# Patient Record
Sex: MALE | Race: ASIAN | NOT HISPANIC OR LATINO | Employment: FULL TIME | URBAN - METROPOLITAN AREA
[De-identification: names, ages, dates, MRNs, and addresses within clinical notes are randomized per-mention and may not be internally consistent; named-entity substitution may affect disease eponyms.]

---

## 2019-01-14 DIAGNOSIS — F31.9 BIPOLAR DISORDER, UNSPECIFIED (H): Primary | ICD-10-CM

## 2019-01-14 LAB
ALBUMIN SERPL-MCNC: 3.8 G/DL (ref 3.4–5)
ALP SERPL-CCNC: 39 U/L (ref 40–150)
ALT SERPL W P-5'-P-CCNC: 25 U/L (ref 0–70)
ANION GAP SERPL CALCULATED.3IONS-SCNC: 9 MMOL/L (ref 3–14)
AST SERPL W P-5'-P-CCNC: 17 U/L (ref 0–45)
BASOPHILS # BLD AUTO: 0 10E9/L (ref 0–0.2)
BASOPHILS NFR BLD AUTO: 0.5 %
BILIRUB SERPL-MCNC: 0.5 MG/DL (ref 0.2–1.3)
BUN SERPL-MCNC: 19 MG/DL (ref 7–30)
CALCIUM SERPL-MCNC: 9.1 MG/DL (ref 8.5–10.1)
CHLORIDE SERPL-SCNC: 105 MMOL/L (ref 94–109)
CHOLEST SERPL-MCNC: 176 MG/DL
CO2 SERPL-SCNC: 27 MMOL/L (ref 20–32)
CREAT SERPL-MCNC: 0.8 MG/DL (ref 0.66–1.25)
DIFFERENTIAL METHOD BLD: NORMAL
EOSINOPHIL # BLD AUTO: 0.2 10E9/L (ref 0–0.7)
EOSINOPHIL NFR BLD AUTO: 4.7 %
ERYTHROCYTE [DISTWIDTH] IN BLOOD BY AUTOMATED COUNT: 11.7 % (ref 10–15)
GFR SERPL CREATININE-BSD FRML MDRD: >90 ML/MIN/{1.73_M2}
GGT SERPL-CCNC: 17 U/L (ref 0–75)
GLUCOSE SERPL-MCNC: 96 MG/DL (ref 70–99)
HCT VFR BLD AUTO: 44.2 % (ref 40–53)
HDLC SERPL-MCNC: 65 MG/DL
HGB BLD-MCNC: 14.9 G/DL (ref 13.3–17.7)
LDLC SERPL CALC-MCNC: 95 MG/DL
LYMPHOCYTES # BLD AUTO: 2 10E9/L (ref 0.8–5.3)
LYMPHOCYTES NFR BLD AUTO: 44.2 %
MCH RBC QN AUTO: 33 PG (ref 26.5–33)
MCHC RBC AUTO-ENTMCNC: 33.7 G/DL (ref 31.5–36.5)
MCV RBC AUTO: 98 FL (ref 78–100)
MONOCYTES # BLD AUTO: 0.4 10E9/L (ref 0–1.3)
MONOCYTES NFR BLD AUTO: 9 %
NEUTROPHILS # BLD AUTO: 1.8 10E9/L (ref 1.6–8.3)
NEUTROPHILS NFR BLD AUTO: 41.6 %
NONHDLC SERPL-MCNC: 111 MG/DL
PLATELET # BLD AUTO: 156 10E9/L (ref 150–450)
POTASSIUM SERPL-SCNC: 4.1 MMOL/L (ref 3.4–5.3)
PROT SERPL-MCNC: 7.1 G/DL (ref 6.8–8.8)
RBC # BLD AUTO: 4.51 10E12/L (ref 4.4–5.9)
SODIUM SERPL-SCNC: 141 MMOL/L (ref 133–144)
TRIGL SERPL-MCNC: 82 MG/DL
TSH SERPL DL<=0.005 MIU/L-ACNC: 0.91 MU/L (ref 0.4–4)
VALPROATE SERPL-MCNC: 118 MG/L (ref 50–100)
WBC # BLD AUTO: 4.4 10E9/L (ref 4–11)

## 2019-01-14 PROCEDURE — 80061 LIPID PANEL: CPT

## 2019-01-14 PROCEDURE — 36415 COLL VENOUS BLD VENIPUNCTURE: CPT

## 2019-01-14 PROCEDURE — 82977 ASSAY OF GGT: CPT

## 2019-01-14 PROCEDURE — 85025 COMPLETE CBC W/AUTO DIFF WBC: CPT

## 2019-01-14 PROCEDURE — 80164 ASSAY DIPROPYLACETIC ACD TOT: CPT

## 2019-01-14 PROCEDURE — 80053 COMPREHEN METABOLIC PANEL: CPT

## 2019-01-14 PROCEDURE — 84443 ASSAY THYROID STIM HORMONE: CPT

## 2019-08-05 ENCOUNTER — COMMUNICATION - HEALTHEAST (OUTPATIENT)
Dept: TELEHEALTH | Facility: CLINIC | Age: 44
End: 2019-08-05

## 2019-08-05 ENCOUNTER — OFFICE VISIT - HEALTHEAST (OUTPATIENT)
Dept: INTERNAL MEDICINE | Facility: CLINIC | Age: 44
End: 2019-08-05

## 2019-08-05 DIAGNOSIS — R68.89 FLU-LIKE SYMPTOMS: ICD-10-CM

## 2019-08-05 DIAGNOSIS — F31.71 BIPOLAR DISORDER, IN PARTIAL REMISSION, MOST RECENT EPISODE HYPOMANIC (H): ICD-10-CM

## 2019-08-05 LAB
ALBUMIN UR-MCNC: NEGATIVE MG/DL
ALT SERPL W P-5'-P-CCNC: 18 U/L (ref 0–45)
ANION GAP SERPL CALCULATED.3IONS-SCNC: 10 MMOL/L (ref 5–18)
APPEARANCE UR: CLEAR
AST SERPL W P-5'-P-CCNC: 22 U/L (ref 0–40)
BASOPHILS # BLD AUTO: 0 THOU/UL (ref 0–0.2)
BASOPHILS NFR BLD AUTO: 1 % (ref 0–2)
BILIRUB UR QL STRIP: NEGATIVE
BUN SERPL-MCNC: 16 MG/DL (ref 8–22)
C REACTIVE PROTEIN LHE: <0.1 MG/DL (ref 0–0.8)
CALCIUM SERPL-MCNC: 9.7 MG/DL (ref 8.5–10.5)
CHLORIDE BLD-SCNC: 98 MMOL/L (ref 98–107)
CO2 SERPL-SCNC: 26 MMOL/L (ref 22–31)
COLOR UR AUTO: YELLOW
CREAT SERPL-MCNC: 1.02 MG/DL (ref 0.7–1.3)
EOSINOPHIL # BLD AUTO: 0.4 THOU/UL (ref 0–0.4)
EOSINOPHIL NFR BLD AUTO: 8 % (ref 0–6)
ERYTHROCYTE [DISTWIDTH] IN BLOOD BY AUTOMATED COUNT: 12.1 % (ref 11–14.5)
GFR SERPL CREATININE-BSD FRML MDRD: >60 ML/MIN/1.73M2
GLUCOSE BLD-MCNC: 97 MG/DL (ref 70–125)
GLUCOSE UR STRIP-MCNC: NEGATIVE MG/DL
HCT VFR BLD AUTO: 44.2 % (ref 40–54)
HGB BLD-MCNC: 15.4 G/DL (ref 14–18)
HGB UR QL STRIP: NEGATIVE
HIV 1+2 AB+HIV1 P24 AG SERPL QL IA: NEGATIVE
KETONES UR STRIP-MCNC: NEGATIVE MG/DL
LEUKOCYTE ESTERASE UR QL STRIP: NEGATIVE
LYMPHOCYTES # BLD AUTO: 1.8 THOU/UL (ref 0.8–4.4)
LYMPHOCYTES NFR BLD AUTO: 31 % (ref 20–40)
MCH RBC QN AUTO: 33.6 PG (ref 27–34)
MCHC RBC AUTO-ENTMCNC: 34.8 G/DL (ref 32–36)
MCV RBC AUTO: 96 FL (ref 80–100)
MONOCYTES # BLD AUTO: 0.5 THOU/UL (ref 0–0.9)
MONOCYTES NFR BLD AUTO: 9 % (ref 2–10)
NEUTROPHILS # BLD AUTO: 3 THOU/UL (ref 2–7.7)
NEUTROPHILS NFR BLD AUTO: 53 % (ref 50–70)
NITRATE UR QL: NEGATIVE
PH UR STRIP: 5.5 [PH] (ref 5–8)
PLATELET # BLD AUTO: 174 THOU/UL (ref 140–440)
PMV BLD AUTO: 7.2 FL (ref 7–10)
POTASSIUM BLD-SCNC: 4.1 MMOL/L (ref 3.5–5)
RBC # BLD AUTO: 4.58 MILL/UL (ref 4.4–6.2)
SODIUM SERPL-SCNC: 134 MMOL/L (ref 136–145)
SP GR UR STRIP: 1.02 (ref 1–1.03)
UROBILINOGEN UR STRIP-ACNC: NORMAL
VALPROATE SERPL-MCNC: 53.4 UG/ML (ref 50–150)
WBC: 5.8 THOU/UL (ref 4–11)

## 2019-08-05 ASSESSMENT — MIFFLIN-ST. JEOR: SCORE: 1660.97

## 2019-08-06 ENCOUNTER — COMMUNICATION - HEALTHEAST (OUTPATIENT)
Dept: INTERNAL MEDICINE | Facility: CLINIC | Age: 44
End: 2019-08-06

## 2019-08-06 LAB
B BURGDOR IGG+IGM SER QL: 0.07 INDEX VALUE
C TRACH DNA SPEC QL PROBE+SIG AMP: NEGATIVE
HBV SURFACE AG SERPL QL IA: NEGATIVE
HCV AB SERPL QL IA: NEGATIVE
N GONORRHOEA DNA SPEC QL NAA+PROBE: NEGATIVE
T PALLIDUM AB SER QL: NEGATIVE

## 2019-08-09 ENCOUNTER — COMMUNICATION - HEALTHEAST (OUTPATIENT)
Dept: INTERNAL MEDICINE | Facility: CLINIC | Age: 44
End: 2019-08-09

## 2019-08-09 LAB
B19V IGG SER IA-ACNC: 4.39 IV
B19V IGM SER IA-ACNC: 0.12 IV

## 2020-01-17 ENCOUNTER — OFFICE VISIT - HEALTHEAST (OUTPATIENT)
Dept: INTERNAL MEDICINE | Facility: CLINIC | Age: 45
End: 2020-01-17

## 2020-01-17 DIAGNOSIS — Z00.00 ROUTINE HISTORY AND PHYSICAL EXAMINATION OF ADULT: ICD-10-CM

## 2020-01-17 DIAGNOSIS — F19.10 SUBSTANCE ABUSE (H): ICD-10-CM

## 2020-01-17 DIAGNOSIS — F31.71 BIPOLAR DISORDER, IN PARTIAL REMISSION, MOST RECENT EPISODE HYPOMANIC (H): ICD-10-CM

## 2020-01-17 LAB
ALT SERPL W P-5'-P-CCNC: 18 U/L (ref 0–45)
ANION GAP SERPL CALCULATED.3IONS-SCNC: 13 MMOL/L (ref 5–18)
AST SERPL W P-5'-P-CCNC: 32 U/L (ref 0–40)
BUN SERPL-MCNC: 23 MG/DL (ref 8–22)
CALCIUM SERPL-MCNC: 9.3 MG/DL (ref 8.5–10.5)
CHLORIDE BLD-SCNC: 104 MMOL/L (ref 98–107)
CHOLEST SERPL-MCNC: 180 MG/DL
CO2 SERPL-SCNC: 24 MMOL/L (ref 22–31)
CREAT SERPL-MCNC: 0.77 MG/DL (ref 0.7–1.3)
FASTING STATUS PATIENT QL REPORTED: NO
GFR SERPL CREATININE-BSD FRML MDRD: >60 ML/MIN/1.73M2
GLUCOSE BLD-MCNC: 99 MG/DL (ref 70–125)
HDLC SERPL-MCNC: 75 MG/DL
HIV 1+2 AB+HIV1 P24 AG SERPL QL IA: NEGATIVE
LDLC SERPL CALC-MCNC: 93 MG/DL
POTASSIUM BLD-SCNC: 4.3 MMOL/L (ref 3.5–5)
SODIUM SERPL-SCNC: 141 MMOL/L (ref 136–145)
TRIGL SERPL-MCNC: 60 MG/DL
TSH SERPL DL<=0.005 MIU/L-ACNC: 0.81 UIU/ML (ref 0.3–5)

## 2020-01-17 ASSESSMENT — MIFFLIN-ST. JEOR: SCORE: 1588.4

## 2020-01-18 LAB — HBV SURFACE AG SERPL QL IA: NEGATIVE

## 2020-01-20 LAB — HCV AB SERPL QL IA: NEGATIVE

## 2020-01-21 ENCOUNTER — COMMUNICATION - HEALTHEAST (OUTPATIENT)
Dept: INTERNAL MEDICINE | Facility: CLINIC | Age: 45
End: 2020-01-21

## 2020-02-03 ENCOUNTER — COMMUNICATION - HEALTHEAST (OUTPATIENT)
Dept: BEHAVIORAL HEALTH | Facility: CLINIC | Age: 45
End: 2020-02-03

## 2020-02-05 ENCOUNTER — COMMUNICATION - HEALTHEAST (OUTPATIENT)
Dept: INTERNAL MEDICINE | Facility: CLINIC | Age: 45
End: 2020-02-05

## 2020-07-01 ENCOUNTER — HOSPITAL ENCOUNTER (OUTPATIENT)
Dept: BEHAVIORAL HEALTH | Facility: CLINIC | Age: 45
Discharge: HOME | End: 2020-07-01
Attending: FAMILY MEDICINE | Admitting: FAMILY MEDICINE
Payer: COMMERCIAL

## 2020-07-01 VITALS — HEIGHT: 69 IN | BODY MASS INDEX: 25.18 KG/M2 | WEIGHT: 170 LBS

## 2020-07-01 PROCEDURE — H0001 ALCOHOL AND/OR DRUG ASSESS: HCPCS | Mod: HF,TEL

## 2020-07-01 RX ORDER — DIVALPROEX SODIUM 500 MG/1
3 TABLET, DELAYED RELEASE ORAL
COMMUNITY
Start: 2019-04-28 | End: 2021-11-16

## 2020-07-01 ASSESSMENT — ANXIETY QUESTIONNAIRES
GAD7 TOTAL SCORE: 3
2. NOT BEING ABLE TO STOP OR CONTROL WORRYING: NOT AT ALL
IF YOU CHECKED OFF ANY PROBLEMS ON THIS QUESTIONNAIRE, HOW DIFFICULT HAVE THESE PROBLEMS MADE IT FOR YOU TO DO YOUR WORK, TAKE CARE OF THINGS AT HOME, OR GET ALONG WITH OTHER PEOPLE: NOT DIFFICULT AT ALL
5. BEING SO RESTLESS THAT IT IS HARD TO SIT STILL: SEVERAL DAYS
3. WORRYING TOO MUCH ABOUT DIFFERENT THINGS: NOT AT ALL
6. BECOMING EASILY ANNOYED OR IRRITABLE: SEVERAL DAYS
1. FEELING NERVOUS, ANXIOUS, OR ON EDGE: NOT AT ALL
7. FEELING AFRAID AS IF SOMETHING AWFUL MIGHT HAPPEN: NOT AT ALL
4. TROUBLE RELAXING: SEVERAL DAYS

## 2020-07-01 ASSESSMENT — MIFFLIN-ST. JEOR: SCORE: 1636.49

## 2020-07-01 ASSESSMENT — PATIENT HEALTH QUESTIONNAIRE - PHQ9: SUM OF ALL RESPONSES TO PHQ QUESTIONS 1-9: 1

## 2020-07-01 ASSESSMENT — PAIN SCALES - GENERAL: PAINLEVEL: NO PAIN (0)

## 2020-07-01 NOTE — PROGRESS NOTES
"There are potential benefits and risks of telephone visits (e.g. limits to patient confidentiality) that differ from in-person visits.?  Confidentiality still applies for telephone services, and nobody will record the visit.  It is important to be in a quiet, private space that is free of distractions (including cell phone or other devices) during the visit.??     If during the course of the call I believe a telephone visit is not appropriate, you will not be charged for \"this service\"    Consent has been obtained for this service by care team member: Yes    Phone visit start time:  1:00 pm  Phone visit end time:  2:15 pm  [address] [address]      ADULT CD ASSESSMENT ADDENDUM      Patient Name: Mendy Feliz  Cell Phone:   Home: [phone] (home)    Mobile:   Telephone Information:   Mobile [phone]     Email:  [email or web address]  Last 4 digits of Social Security #: [id number]  Emergency Contact: Gloria Feliz (mother) Tel: [phone]    The patient reported:  being single, with no serious involvement    With which race do you identify? Other: American    Initial Screening Questions     1. Are you currently having severe withdrawal symptoms that are putting yourself or others in danger?  No    2. Are you currently having severe medical problems that require immediate attention?  No    3. Are you currently having severe emotional or behavioral problems that are putting yourself or others at risk of harm?  No    4. Do you have sufficient reading skills that will enable you to understand written materials, including the program rules and client rights materials?  Yes     Family History and other additional information     Please tell me what it was like growing up in your family. (please include any history of substance abuse, mental health issues, emotional/physical/sexual abuse, forms of discipline, and support)     The patient reported being raised " by both of his parents.  The patient denied experiencing or witnessing any verbal, physical or sexual abuse in the family home.   The patient reported discipline in his family home was in the form of verbal reprimands or being spanked.  The patient reported feeling supported by both of his parents.  The patient reported a history of:   Family History   Problem Relation Age of Onset     Family History Negative Mother      Family History Negative Father      Family History Negative Maternal Grandmother      Substance Abuse Other      Do you have any children or Stepchildren? No    Are you being investigated by Child Protection Services? No    Do you have a child protection worker, probation office or ?  Yes, explain: He is on court probation due to having multiple DWI charges.    How would you describe your current finances?  Just making it    If you are having problems, (unpaid bills, bankruptcy, IRS problems) please explain:  No    If working or a student are you able to function appropriately in that setting? 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     Describe your preferred learning style:  by watching someone else demonstrate    What are your some of your personal strengths?  Exercising and intelligent    Do you currently participate in community td activities, such as attending Mosque, temple, Denominational or Yazidism services?  No    How does your spirituality impact your recovery?  The patient denied having any spiritual beliefs at this time.    Do you currently self-administer your medications?  Yes    Have you ever had to lie to people important to you about how much you brady?   No   Have you ever felt the need to bet more and more money?   No   Have you ever attempted treatment for a gambling problem?   No   Have you ever touched or fondled someone else inappropriately or forced them to have sex with you against  their will?   No   Are you or have you ever been a registered sex offender?   No   Is there any history of sexual abuse in your family?   No   Have you ever felt obsessed by your sexual behavior, such as having sex with many partners, masturbating often, using pornography often?   No     Have you ever received therapy or stayed in the hospital for mental health problems?   Yes, explain: IP at [facility] times.     Have you ever hurt yourself, such as cutting, burning or hitting yourself?   No     Have you ever purged, binged or restricted yourself as a way to control your weight?   No     Are you on a special diet?   No     Do you have any concerns regarding your nutritional status?   No     Have you had any appetite changes in the last 3 months?   No   Have you had weight loss or weight gain of more than 10 lbs in the last 3 months?   If patient gained or lost more than 10 lbs, then refer to program RN / attending Physician for assessment.   No   Was the patient informed of BMI?    Above,  General nutrition education   Yes   Have you engaged in any risk-taking behavior that would put you at risk for exposure to blood-borne or sexually transmitted diseases?   No   Do you have any dental problems?   Yes, Patient to discuss dental issues with the LP nurse.     Have you ever lived through any trauma or stressful life events?    The patient denied having any history of being verbally, emotionally, physically or sexually abused.  The patient reported having a history of trauma issues related to going through an emotionally painful divorce in 2014.   Yes, explain: See the notes to the left.     In the past month, have you had any of the following symptoms related to the trauma listed above? (dreams, intense memories, flashbacks, physical reactions, etc.)   No   Have you ever believed people were spying on you, or that someone was plotting against you or trying to hurt you?   No   Have you ever believed someone was reading  your mind or could hear your thoughts or that you could actually read someone's mind or hear what another person was thinking?   No   Have you ever believed that someone of some force outside of yourself was putting thoughts into your mind or made you act in a way that was not your usual self?  Have you ever though you were possessed?   No   Have you ever believed you were being sent special messages through the TV, radio or newspaper?   No   Have you ever heard things other people couldn't hear, such as voices or other noises?   No   Have you ever had visions when you were awake?  Or have you ever seen things other people couldn't see?   No   Do you have a valid 's license?    No, explain: DL is revoked.       PHQ-9, LUIS-7 and Suicide Risk Assessment   PHQ-9 on 7/1/2020 LUIS-7 on 7/1/2020   The patient's PHQ-9 score was 1 out of 27, indicating minimal depression.   The patient's LUIS-7 score was 3 out of 21, indicating minimal anxiety.       [place]-Suicide Severity Rating Scale   Suicide Ideation   1.) Have you ever wished you were dead or that you could go to sleep and not wake up?     Lifetime:  No   Past Month:  No     2.) Have you actually had any thoughts of killing yourself?   Lifetime:  No   Past Month:  No     3.) Have you been thinking about how you might do this?     Lifetime:  No   Past Month:  No     4.) Have you had these thoughts and had some intention of acting on them?     Lifetime:  No   Past Month:  No     5.) Have you started to work out the details of how to kill yourself?   Lifetime:  No   Past Month:  No     6.) Do you intend to carry out this plan?      Lifetime:  No   Past Month:  No     Intensity of Ideation   Intensity of ideation (1 being least severe, 5 being most severe):     Lifetime:  The patient denied ever having any suicidal thoughts in life.   Past Month:  The patient denied ever having any suicidal thoughts in life.     How often do you have these thoughts?  The patient  denied ever having any suicidal thoughts in life.     When you have the thoughts how long do they last?  The patient denied ever having any suicidal thoughts in life.     Can you stop thinking about killing yourself or wanting to die if you want to?  The patient denied ever having any suicidal thoughts in life.     Are there things - anyone or anything (i.e. family, Druze, pain of death) that stopped you from wanting to die or acting on thoughts of suicide?  Does not apply     What sort of reasons did you have for thinking about wanting to die or killing yourself (ie end pain, stop how you were feeling, get attention or reaction, revenge)?  Does not apply     Suicidal Behavior   (Suicide Attempt) - Have you made a suicide attempt?     Lifetime:  The patient had never made a suicide attempt.   Past Month:  The patient had never made a suicide attempt.     Have you engaged in self-harm (non-suicidal self-injury)?  The patient denied having any history of engaging in self-harm (non-suicidal self-injury).     (Interrupted Attempt) - Has there been a time when you started to do something to end your life but someone or something stopped you before you actually did anything?  No     (Aborted or Self-Interrupted Attempt) - Has there been a time when you started to do something to try to end your life but you stopped yourself before you actually did anything?  No     (Preparatory Acts of Behavior) - Have you taken any steps towards making suicide attempt or preparing to kill yourself (such as collecting pills, getting a gun, giving valuables away or writing a suicide note)?  No     Actual Lethality/Medical Damage:  The patient denied ever making a suicidal attempt.       2008  [employer]  Used with permission by Jalyn Boyce, PhD.       Guide to C-SSRS Risk Ratings   NO IDEATION:  with no active thoughts IDEATION: with a wish to die. IDEATION: with active thoughts. Risk Ratings   If  "Yes No No 0 - Very Low Risk   If NA Yes No 1 - Low Risk   If NA Yes Yes 2 - Low/moderate risk   IDEATION: associated thoughts of methods without intent or plan INTENT: Intent to follow through on suicide PLAN: Plan to follow through on suicide Risk Ratings cont...   If Yes No No 3 - Moderate Risk   If Yes Yes No 4 - High Risk   If Yes Yes Yes 5 - High Risk   The patient's ADDITIONAL RISK FACTORS and lack of PROTECTIVE FACTORS may increase their overall suicide risk ratings.     Additional Risk Factors:    History of impulsive behaviors   Protective Factors:    Having people in his/her life that would prevent the patient from considering a suicide attempt (i.e. young children, spouse, parents, etc.)     Having easy access to supportive family members     Having beliefs which discourage suicide     Risk Status   Past month: 0. - Very Low Risk:  Evaluation Counselors:  Document in Epic / [facility] to counselor \"Very Low Risk\".      Treatment Counselors:  Reassess upon admission as applicable, assess weekly in progress notes under [facility] and summarize in Discharge / Treatment summary under [facility].    Past 24 hours: 0. - Very Low Risk:  Evaluation Counselors:  Document in Epic / [employer] to counselor \"Very Low Risk\".      Treatment Counselors:  Reassess upon admission as applicable, assess weekly in progress notes under [facility] and summarize in Discharge / Treatment summary under [facility].   Additional information to support suicide risk rating: There was no additional information to provide at this time.     Mental Health Status   Unable to assess the patient's mental health status on 7/1/2020 due to the substance abuse assessment being conducted over the phone secondary to the COVID-19 virus pandemic.        Criteria for Diagnosis: DSM-5 Criteria for Substance Use Disorders      1.)  Amphetamine Use Disorder Severe - 304.40 (F15.20)  2.)  Alcohol Use Disorder Moderate - 303.90 (F10.20)  3.)  Cannabis Use " Disorder Moderate - 304.30 (F12.20), in sustained remission  4.)  Cocaine Use Disorder Mild - 305.60 (F14.10), in sustained remission  5.)  Tobacco Use Disorder Moderate - 305.10 (F17.200)  6.)  BPAD, per patient self-report    Level of Care   I.) Intoxication and Withdrawal: 1   II.) Biomedical:  0   III.) Emotional and Behavioral:  2   IV.) Readiness to Change:  3   V.) Relapse Potential: 4   VI.) Recovery Environmental: 3     Initial Problem List     The patient lacks relapse prevention skills  The patient has poor coping skills  The patient has poor refusal skills   The patient lacks a sober peer support network  The patient has dual issues of MI and CD  The patient has a history of legal issues    Patient/Client is willing to follow treatment recommendations.  Yes    Counselor: RADHA Pina

## 2020-07-01 NOTE — PROGRESS NOTES
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 manner and then his substance abuse would increase over a period of time.    2. Have you experienced cravings? If yes, ask follow up questions to determine if the person recognizes triggers and if the person has had any success in dealing with them.     The patient reported having some infrequent cravings to use mood altering chemicals.    3. Have you been treated for alcohol/other drug abuse/dependence? Yes.      3B. Number of times(lifetime) (over what period) 4.      3C. Number of times completed treatment (lifetime) 3.      3D. During the past three years have you participated in outpatient and/or residential?  Yes.      3E. When and where? His most recent treatment was at [facility] in [place], [place] around 2-months ago, but he left that program AMA after having a conflict with a staff member.       3F. What was helpful? What was not? Most things in his prior treatments had been helpful for him.    4. Support group participation: Have you/do you attend support group meetings to reduce/stop your alcohol/drug use? How recently? What was your experience? Are you willing to restart? If the person has not  participated, is he or she willing?     The patient reported a remote history of attending 12-step or other support group meetings, but denied having any recent attendance at meetings.    5. What would assist you in staying sober/straight?     Entering the Lodging Plus program at [facility] for primary substance abuse treatment, developing sober coping skills, developing long-term sober maintenance skills, developing a sober peer support network and addressing dual issues of substance abuse and mental health.    Dimension V Ratings   Relapse/Continued Use/Continued problem potential - The placing authority must use the criteria in Dimension V to determine a client s relapse, continued use, and continued problem potential.   RISK DESCRIPTIONS - Severity ratin No awareness of the negative impact of mental health problems or substance abuse. No coping skills to arrest mental health or addiction illnesses, or prevent relapse.    REASONS SEVERITY WAS ASSIGNED - (What information did the person provide that indicates his or her understanding of relapse issues? What about the person s experience indicates how prone he or she is to relapse? What coping skills does the person have that decrease relapse potential?)      The patient had continued to abuse mood altering chemicals despite having negative consequences in many life areas, including having mental health issues, relationship problems, employment problems, financial problems and legal problems which were exacerbated by his substance abuse.  The patient appears to lack sober coping skills and he lacks long-term sober maintenance skills.  The patient has dual issues of mental health and substance abuse.  The patient reported his current unemployment could be a potential trigger for him to abuse mood altering chemicals.  The patient lacks a current sober peer support network.       DIMENSION VI - Recovery Environment   1. Are you  employed/attending school? Tell me about that.     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     2A. Describe a typical day; evening for you. Work, school, social, leisure, volunteer, spiritual practices. Include time spent obtaining, using, recovering from drugs or alcohol. (DSM)     Wake up, exercise, go for walks, play computer games and watch TV.    Please describe what leisure activities have been associated with your substance abuse:     The patient denied having any leisure activities which had been associated with his substance abuse.    2B. How often do you spend more time than you planned using or use more than you planned? (DSM)     1 time per month.    3. How important is using to your social connections? Do many of your family or friends use?     It is not very important    4A. Are you currently in a significant relationship?     No    4C. Sexual Orientation:     Heterosexual    5A. Who do you live with?      The patient reported living with his mother in a condominium.      5B. Tell me about their alcohol/drug use and mental health issues.     The patient reported his mother does not abuse any mood altering chemicals and she is supportive of him abstaining from methamphetamine and from all other non-prescribed mood altering chemicals.     5C. Are you concerned for your safety there? No    5D. Are you concerned about the safety of anyone else who lives with you? No    6A. Do you have children who live with you?     No    6B. Do you have children who do not live with you?     No    7A. Who supports you in making changes in your alcohol or drug use? Would you like your family to participate in your treatment? If so, how? What are they willing to do to support you? Who is upset or angry about you making changes in your alcohol or drug use? How big a problem is this for you?      The patient reported his mother and his entire family are  supportive of him abstaining from methamphetamine and from all other non-prescribed mood altering chemicals.  The patient reported having a very minimal social support network and he lacks a sober peer support network.    7B. This table is provided to record information about the person s relationships and available support It is not necessary to ask each item; only to get a comprehensive picture of their support system.  How often can you count on the following people when you need someone?   Partner / Spouse The patient does not have a current partner or spouse.   Parent(s)/Aunt(s)/Uncle(s)/Grandparents Usually supportive   Sibling(s)/Cousin(s) Usually supportive   Child(leroy) The patient doesn't have any children.   Other relative(s) Usually supportive   Friend(s)/neighbor(s) Rarely supportive   Child(leroy) s father(s)/mother(s) The patient doesn't have any children.   Support group member(s) The patient denied having any current involvement with 12-step or other support group meetings.   Community of td members The patient denied having any current involvement with community td members.   /counselor/therapist/healer The patient denied having any current involvement with a , counselor, therapist or healer.   Other (specify) No     8A. What is your current living situation?     The patient reported living with his mother in a condominium.  The patient reported his mother does not abuse any mood altering chemicals and she is supportive of him abstaining from methamphetamine and from all other non-prescribed mood altering chemicals.  The patient denied having any concerns regarding his immediate living environment and/or neighborhood, but he had been unable to maintain abstinence from methamphetamine while living there.    8B. What is your long term plan for where you will be living?     The plan is to continue to live at current residence.    8C. Tell me about your living  environment/neighborhood? Ask enough follow up questions to determine safety, criminal activity, availability of alcohol and drugs, supportive or antagonistic to the person making changes.      The patient reported living with his mother in a condominium.  The patient reported his mother does not abuse any mood altering chemicals and she is supportive of him abstaining from methamphetamine and from all other non-prescribed mood altering chemicals.  The patient denied having any concerns regarding his immediate living environment and/or neighborhood, but he had been unable to maintain abstinence from methamphetamine while living there.    9. Criminal justice history: Gather current/recent history and any significant history related to substance use--Arrests? Convictions? Circumstances? Alcohol or drug involvement? Sentences? Still on probation or parole? Expectations of the court? Current court order? Any sex offenses - lifetime? What level? (DSM)    The patient reported having a history of legal issues, including having 4 DWI charges.  The patient reported being on court probation for his multiple DWI charges, but he did not wish to disclose any additional information and he did not want to sign an DAPHNE for his .    10. What obstacles exist to participating in treatment? (Time off work, childcare, funding, transportation, pending senior care time, living situation)     The patient denied having any obstacles for participating in substance abuse treatment.    Dimension VI Ratings   Recovery environment - The placing authority must use the criteria in Dimension VI to determine a client s recovery environment.   RISK DESCRIPTIONS - Severity rating: 3 Client is not engaged in structured, meaningful activity and the client's peers, family, significant other, and living environment are unsupportive, or there is significant criminal justice system involvement.    REASONS SEVERITY WAS ASSIGNED - (What support does  the person have for making changes? What structure/stability does the person have in his or her daily life that will increase the likelihood that changes can be sustained? What problems exist in the person s environment that will jeopardize getting/staying clean and sober?)     The patient reported living with his mother in a condominium.  The patient reported his mother does not abuse any mood altering chemicals and she is supportive of him abstaining from methamphetamine and from all other non-prescribed mood altering chemicals.  The patient denied having any concerns regarding his immediate living environment and/or neighborhood, but he had been unable to maintain abstinence from methamphetamine while living there.  The patient identified as being heterosexual and he reported being single and not in a romantic relationship at this time.  The patient reported going through an emotionally painful divorce from his wife in 2014.  The patient reported having relationship conflict with his mother and his ex-wife due to his substance abuse issues.  The patient reported his mother and his entire family are supportive of him abstaining from methamphetamine and from all other non-prescribed mood altering chemicals.  The patient reported having a very minimal social support network and he lacks a sober peer support network.  The patient denied having any children.  The patient reported having a history of legal issues, including having 4 DWI charges.  The patient reported being on court probation for his multiple DWI charges, but he did not wish to disclose any additional information and he did not want to sign an DAPHNE for his .  The patient reported his use of methamphetamine had been done both alone and with his other drug using friends.  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  The patient reported having some increased financial stress due  to money being tight at this time.       Client Choice/Exceptions   Would you like services specific to language, age, gender, culture, Pentecostal preference, race, ethnicity, sexual orientation or disability?  No    What particular treatment choices and options would you like to have? [facility]ging Plus program at [facility] in [place], [place] for primary substance abuse treatment    Do you have a preference for a particular treatment program? [facility]ging Plus program at [facility] in [place], [place] for primary substance abuse treatment    Criteria for Diagnosis     Criteria for Diagnosis  DSM-5 Criteria for Substance Use Disorder  Instructions: Determine whether the client currently meets the criteria for Substance Use Disorder using the diagnostic criteria in the DSM-V pp.[phone]. Current means during the most recent 12 months outside a facility that controls access to substances    Category of Substance Severity (ICD-10 Code / DSM 5 Code)     Alcohol Use Disorder Moderate  (F10.20) (303.90)     Cannabis Use Disorder Moderate  (F12.20) (304.30), in sustained remission      Hallucinogen Use Disorder The patient does not meet the criteria for a Hallucinogen use disorder.     Inhalant Use Disorder The patient does not meet the criteria for an Inhalant use disorder.     Opioid Use Disorder The patient does not meet the criteria for an Opioid use disorder.     Sedative, Hypnotic, or Anxiolytic Use Disorder The patient does not meet the criteria for a Sedative/Hypnotic use disorder.     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       Collateral Contact Summary   Number of contacts made: 1    Contact  with referring person:  No    If court related records were reviewed, summarize here: No court records had been reviewed at the time of this documentation.    Information from collateral contacts supported/largely agreed with information from the client and associated risk ratings.    Rule 25 Assessment Summary and Plan   's Recommendation    1.)  It was recommended the patient abstain from alcohol and from all other non-prescribed mood altering chemicals.   2.)  Have a mental health evaluation to address his current clinical mental health issues.  3.)  Follow all of the recommendations of his medical and mental health providers.  4.)  Follow all of the terms and conditions of his court probation, including participating in random alcohol and drug screening with court probation.  5.)  Enter the Lodging Plus program at [facility] in [place], [place] or a similar residential or board and lodging treatment program for primary substance use disorder treatment.  6.)  Follow all of the recommendations of his substance use disorder treatment providers including entering an extended care program as needed.    Collateral Contacts     Name:     Electronic medical records at [facility]   Relationship:    None   Phone Number:    None Releases:    No     The patient's Electronic medical records at [facility] were reviewed and the information contained in the medical records supported the patient's account of his chemical use history and his chemical use consequences.     Collateral Contacts     Name:    Gloria Feliz   Relationship:    mother   Phone Number:    [phone] Releases:    Yes     Collateral data had not yet been obtained from this contact at the time of this documentation.  Ater  Criteria for Diagnosis     A problematic pattern of alcohol/drug use leading to clinically significant impairment or distress, as manifested by at least two of the following, occurring within a 12-month period:    1.)  Alcohol/drug is often taken in larger amounts or over a longer period than was intended.  2.) There is a persistent desire or unsuccessful efforts to cut down or control alcohol/drug use  4.) Craving, or a strong desire or urge to use alcohol/drug  5.) Recurrent alcohol/drug use resulting in a failure to fulfill major role obligations at work, school or home.  6.) Continued alcohol use despite having persistent or recurrent social or interpersonal problems caused or exacerbated by the effects of alcohol/drug.  8.) Recurrent alcohol/drug use in situations in which it is physically hazardous.  9.) Alcohol/drug use is continued despite knowledge of having a persistent or recurrent physical or psychological problem that is likely to have been caused or exacerbated by alcohol.  10.) Tolerance, as defined by either of the following: A need for markedly increased amounts of alcohol/drug to achieve intoxication or desired effect.  11.) Withdrawal, as manifested by either of the following: The characteristic withdrawal syndrome for alcohol/drug (refer to Criteria A and B of the criteria set for alcohol/drug withdrawal).    Specify if: In early remission:  After full criteria for alcohol/drug use disorder were previously met, none of the criteria for alcohol/drug use disorder have been met for at least 3 months but for less than 12 months (with the exception that Criterion A4,  Craving or a strong desire or urge to use alcohol/drug  may be met).     In sustained remission:   After full criteria for alcohol use disorder were previously met, none of the criteria for alcohol/drug use disorder have been met at any time during a period of 12 months or longer (with the exception that Criterion A4,  Craving or strong desire or urge to use alcohol/drug  may be met).   Specify if:   This additional specifier is used if the individual is in an environment where access to alcohol is restricted.    Mild: Presence of 2-3  symptoms  Moderate: Presence of 4-5 symptoms  Severe: Presence of 6 or more symptoms

## 2020-07-02 ASSESSMENT — ANXIETY QUESTIONNAIRES: GAD7 TOTAL SCORE: 3

## 2020-07-07 ENCOUNTER — HOSPITAL ENCOUNTER (OUTPATIENT)
Dept: BEHAVIORAL HEALTH | Facility: CLINIC | Age: 45
End: 2020-07-07
Attending: FAMILY MEDICINE
Payer: COMMERCIAL

## 2020-07-07 VITALS
DIASTOLIC BLOOD PRESSURE: 69 MMHG | HEART RATE: 88 BPM | WEIGHT: 162 LBS | SYSTOLIC BLOOD PRESSURE: 103 MMHG | BODY MASS INDEX: 23.99 KG/M2 | TEMPERATURE: 97.8 F | HEIGHT: 69 IN | OXYGEN SATURATION: 100 %

## 2020-07-07 PROBLEM — F19.20 CHEMICAL DEPENDENCY (H): Status: ACTIVE | Noted: 2020-07-07

## 2020-07-07 LAB
SARS-COV-2 PCR COMMENT: NORMAL
SARS-COV-2 RNA SPEC QL NAA+PROBE: NEGATIVE
SARS-COV-2 RNA SPEC QL NAA+PROBE: NORMAL
SPECIMEN SOURCE: NORMAL
SPECIMEN SOURCE: NORMAL

## 2020-07-07 PROCEDURE — H2035 A/D TX PROGRAM, PER HOUR: HCPCS

## 2020-07-07 PROCEDURE — U0003 INFECTIOUS AGENT DETECTION BY NUCLEIC ACID (DNA OR RNA); SEVERE ACUTE RESPIRATORY SYNDROME CORONAVIRUS 2 (SARS-COV-2) (CORONAVIRUS DISEASE [COVID-19]), AMPLIFIED PROBE TECHNIQUE, MAKING USE OF HIGH THROUGHPUT TECHNOLOGIES AS DESCRIBED BY CMS-2020-01-R: HCPCS | Performed by: FAMILY MEDICINE

## 2020-07-07 RX ORDER — AMOXICILLIN 250 MG
2 CAPSULE ORAL DAILY PRN
COMMUNITY
End: 2023-04-21

## 2020-07-07 RX ORDER — ACETAMINOPHEN 325 MG/1
325-650 TABLET ORAL EVERY 4 HOURS PRN
COMMUNITY

## 2020-07-07 RX ORDER — LANOLIN ALCOHOL/MO/W.PET/CERES
3 CREAM (GRAM) TOPICAL
COMMUNITY
End: 2023-04-21

## 2020-07-07 RX ORDER — MAGNESIUM HYDROXIDE/ALUMINUM HYDROXICE/SIMETHICONE 120; 1200; 1200 MG/30ML; MG/30ML; MG/30ML
30 SUSPENSION ORAL EVERY 6 HOURS PRN
COMMUNITY

## 2020-07-07 RX ORDER — IBUPROFEN 200 MG
200-400 TABLET ORAL EVERY 6 HOURS PRN
COMMUNITY
End: 2023-04-21

## 2020-07-07 RX ORDER — LORATADINE 10 MG/1
10 TABLET ORAL DAILY PRN
COMMUNITY
End: 2023-04-21

## 2020-07-07 ASSESSMENT — ANXIETY QUESTIONNAIRES
5. BEING SO RESTLESS THAT IT IS HARD TO SIT STILL: NEARLY EVERY DAY
4. TROUBLE RELAXING: NEARLY EVERY DAY
7. FEELING AFRAID AS IF SOMETHING AWFUL MIGHT HAPPEN: SEVERAL DAYS
6. BECOMING EASILY ANNOYED OR IRRITABLE: SEVERAL DAYS
3. WORRYING TOO MUCH ABOUT DIFFERENT THINGS: NOT AT ALL
2. NOT BEING ABLE TO STOP OR CONTROL WORRYING: NOT AT ALL
1. FEELING NERVOUS, ANXIOUS, OR ON EDGE: NOT AT ALL
GAD7 TOTAL SCORE: 8
IF YOU CHECKED OFF ANY PROBLEMS ON THIS QUESTIONNAIRE, HOW DIFFICULT HAVE THESE PROBLEMS MADE IT FOR YOU TO DO YOUR WORK, TAKE CARE OF THINGS AT HOME, OR GET ALONG WITH OTHER PEOPLE: NOT DIFFICULT AT ALL

## 2020-07-07 ASSESSMENT — PAIN SCALES - GENERAL: PAINLEVEL: NO PAIN (0)

## 2020-07-07 ASSESSMENT — PATIENT HEALTH QUESTIONNAIRE - PHQ9: SUM OF ALL RESPONSES TO PHQ QUESTIONS 1-9: 7

## 2020-07-07 ASSESSMENT — MIFFLIN-ST. JEOR: SCORE: 1600.21
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
## 2020-07-07 NOTE — PROGRESS NOTES
Initial Services Plan         Service Initiation Date:      Immediate health and/or safety concerns: No     Identify health and safety concern(s) below and include plan to address:     None Identified     Treatment suggestions for client during the time between intake (admit date) and completion of the individual treatment plan:      Look for a sober support network, i.e. 12 step, [facility], [facility], etc  Tour the treatment center or outpatient clinic  Introduce yourself to your treatment group. Spend time getting to know your peers  Review your patient or client handbook  Begin working on your treatment goal list     Completed by: RADHA Hernandez  Date completed:

## 2020-07-07 NOTE — PROGRESS NOTES
Progress Note    This patient had a Rule 25 Assessment on 7/1/20 completed by CLAIR King, RADHA.  This patient was seen for a face to face update of the Rule 25 Assessment on 7/7/2020 by RADHA Hernandez.  INSIDE: The patient's Rule 25 Assessment completed on 7/1/20 is in the patient's electronic medical record in Epic in the Chart Review section under the Notes/Trans Tab.    Alcohol/Drug use since the last CD evaluation (include date of last use):     Pt used meth, 7-8 hits per day, SAVANNA 7/7. Pt used THC, 2-3 hits, SAVANNA 7/6.     Please note any other clinical changes since the last CD evaluation (such as medication changes, additional legal charges, detoxification admissions, overdoses, etc.)     No significant changes since the last CD evaluation       ASAM Dimensions Original scores Current Scores   I.) Intoxication and Withdrawal: 1 1   II.) Biomedical:  0 0   III.) Emotional and Behavioral:  2 2   IV.) Readiness to Change:  3 3   V.) Relapse Potential: 4 4   VI.) Recovery Environmental: 3 3     Please list clinical justifications for the above ASAM score changes since the original comprehensive assessment:     None of the ASAM scores on the six dimensions had changed since the Rule 25 Assessment was completed on 7/1/20.       Current LUTHER: Current UA:     0.000     Positive for Amphetamines, Methamphetamine, MDMA and THC and negative for all other screened drugs.       PHQ-9, LUIS-7   PHQ-9 on 7/7/2020 LUIS-7 on 7/7/2020   The patient's PHQ-9 score was 7 out of 27, indicating mild depression.   The patient's LUIS-7 score was 8 out of 21, indicating mild anxiety.       [place]-Suicide Severity Rating Scale Reassessment   Have you ever wished you were dead or that you could go to sleep and not wake up?  Past Month:  No     Have you actually had any thoughts of killing yourself?  Past Month:  No     Have you been thinking about how you might do this?     Past Month:  No   Lifetime:  No   Have you had these thoughts  "and had some intention of acting on them?     Past Month:  No   Lifetime:  No   Have you started to work out the details of how to kill yourself?   Past Month:  No   Lifetime:  No   Do you intend to carry out this plan?   No     When you have the thoughts how long do they last?  The patient denied ever having any suicidal thoughts in life.     Are there things - anyone or anything (i.e. family, Anabaptist, pain of death) that stopped you from wanting to die or acting on thoughts of suicide?  Does not apply       2008  [facility]  Used with permission by Jalyn Boyce, PhD.       Guide to C-SSRS Risk Ratings   NO IDEATION:  with no active thoughts IDEATION: with a wish to die. IDEATION: with active thoughts. Risk Ratings   If Yes No No 0 - Very Low Risk   If NA Yes No 1 - Low Risk   If NA Yes Yes 2 - Low/moderate risk   IDEATION: associated thoughts of methods without intent or plan INTENT: Intent to follow through on suicide PLAN: Plan to follow through on suicide Risk Ratings cont...   If Yes No No 3 - Moderate Risk   If Yes Yes No 4 - High Risk   If Yes Yes Yes 5 - High Risk   The patient's ADDITIONAL RISK FACTORS and lack of PROTECTIVE FACTORS may increase their overall suicide risk ratings.     Additional Risk Factors:    No additional risk factors   Protective Factors:    Having cultural, Anabaptism or spiritual beliefs that discourage suicide     Risk Status   0. - Very Low Risk:  Evaluation Counselors:  Document in Epic / SBAR to counselor \"Very Low Risk\".      Treatment Counselors:  Reassess upon admission as applicable, assess weekly in progress notes under Dimension 3 and summarize in Discharge / Treatment summary under Dimension 3.     Additional information to support suicide risk rating: There was no additional information to provide at this time.       "
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
## 2020-07-08 ASSESSMENT — ANXIETY QUESTIONNAIRES: GAD7 TOTAL SCORE: 8

## 2020-07-08 NOTE — PROGRESS NOTES
"Around 1600hr, a lot of patients were waiting on the 6th floor elevator lobby to go out. As soon writer got out of the office planning to take them out, this pt came to the writer and asked for his cigarette disrespectfully. Meantime, 6th floor staff took the rest of patients out  (as it's already time to take them out) and writer walked toward the 6th floor  to help this patient. Writer asked for his name as writer was meeting him for the first time and he said his name was \"Edwin\" which was incorrect. 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 Pt didn't have any patience and was very disrespectful and kept verbalizing bad words including F** words towards this staff and to this facility as well. Pt also said \"did you ever used meth? Then you will know\". Pt further said he wants to leave if he doesn't get his cigarette now. Writer then went inside the room F678 behind the  to look for his cigarette and made a phone call to LPRN (Melly) to ask if he can go out for smoking or not, as during the shift report the care plan for this pt was to let him sleep and stay inside his room with staff's help by delivering him whatever he needed as the pt didn't have sleep for last 4 days. RN told the writer to call the manager (Afua) and ask if he can go out for smoking. Then writer went back to the office to call the manager and call didn't got picked up and writer was looking for the manager's cell phone number to reach out. And, by that time writer heard pt started yelling in the 6th floor hallway. As soon the writer got out from the office, the 5th floor staff (Barney) was already here and the pt  started yelling at him  thinking that it was him he asked for his cigarettes. After seeing the writer, pt recognized it's the writer whom he asked for the " cigarette, but the pt kept sticking with the staff (Barney) verbalizing all kinds of bad words and threatened the staff (Barney) in-front of this writer and LP RN. The pt said he wants to beat up the staff and also threatened him by saying that he belongs to a gang by showing his tattoo. RN already called up the security and a lady security staff showed up on the unit. When the lady security staff intervened, the pt told the lady security that he was not scared of her. Later, another two male security staffs showed up and pt was escorted out from the unit around 1628hr.

## 2020-07-08 NOTE — PROGRESS NOTES
[address] 5th and 6th Floors  [place] [address]          Mendy Feliz, 1975, was admitted for evaluation/treatment of chemical dependency at [facility].  This person took part in these program(s):    ______ The Inpatient Program   ______ The Outpatient Program   __X___ The [facility]   ______ [facility]       Date admitted: 7/7/20  Date discharged: 7/7/20    Type of discharge:   ______ Satisfactory - completed evaluation / treatment   ______ Discharged without completing   __X___ Behavioral discharge   ______ Transferred to another chemical dependency program   ______ Transferred to another type of service   ______ Left against medical advice (AMA) / Eloped       Comments:       Counselor: RADHA Patel                       Date: 7/7/2020             Time: 4:30 PM
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
## 2020-07-21 ENCOUNTER — OFFICE VISIT - HEALTHEAST (OUTPATIENT)
Dept: INTERNAL MEDICINE | Facility: CLINIC | Age: 45
End: 2020-07-21

## 2020-07-21 DIAGNOSIS — Z20.822 ENCOUNTER FOR LABORATORY TESTING FOR COVID-19 VIRUS: ICD-10-CM

## 2020-07-21 DIAGNOSIS — F31.9 BIPOLAR AFFECTIVE DISORDER, REMISSION STATUS UNSPECIFIED (H): ICD-10-CM

## 2020-07-21 DIAGNOSIS — F15.10 METHAMPHETAMINE ABUSE (H): ICD-10-CM

## 2020-07-21 DIAGNOSIS — Z11.3 SCREEN FOR STD (SEXUALLY TRANSMITTED DISEASE): ICD-10-CM

## 2020-07-21 ASSESSMENT — PATIENT HEALTH QUESTIONNAIRE - PHQ9: SUM OF ALL RESPONSES TO PHQ QUESTIONS 1-9: 0

## 2020-07-22 ENCOUNTER — AMBULATORY - HEALTHEAST (OUTPATIENT)
Dept: LAB | Facility: CLINIC | Age: 45
End: 2020-07-22

## 2020-07-22 DIAGNOSIS — Z11.3 SCREEN FOR STD (SEXUALLY TRANSMITTED DISEASE): ICD-10-CM

## 2020-07-22 LAB — HIV 1+2 AB+HIV1 P24 AG SERPL QL IA: NEGATIVE

## 2020-07-23 LAB
HBV SURFACE AG SERPL QL IA: NEGATIVE
HCV AB SERPL QL IA: NEGATIVE
T PALLIDUM AB SER QL: NEGATIVE

## 2020-07-24 LAB
C TRACH DNA SPEC QL PROBE+SIG AMP: NEGATIVE
N GONORRHOEA DNA SPEC QL NAA+PROBE: NEGATIVE

## 2020-11-14 ENCOUNTER — HEALTH MAINTENANCE LETTER (OUTPATIENT)
Age: 45
End: 2020-11-14

## 2021-05-27 ASSESSMENT — PATIENT HEALTH QUESTIONNAIRE - PHQ9: SUM OF ALL RESPONSES TO PHQ QUESTIONS 1-9: 0

## 2021-05-31 NOTE — PROGRESS NOTES
Office Visit - Follow Up   Mendy Feliz   44 y.o. male    Date of Visit: 8/5/2019         Assessment and Plan   1. Bipolar disorder, in partial remission, most recent episode hypomanic (H)  Remote diagnosis was admitted to [facility] with hypomanic and manic episodes 1999 has been on Depakote ever since.  Has had 3 episodes in his lifetime.  There is no severe symptoms of major depression or suicidal ideation.  He does see a psychiatrist but has not had his valproic acid level checked for 3 months.    2. Flu-like symptoms  Fever, with myalgias and macular rash extending on the arms and trunk sparing the soles, palms of his hands legs and face is very suggestive of viral rash like parvovirus or coxsackievirus.  Also could be seen in tickborne illness like  ehrlichiosis.  He does not have any features of headache, lack of focus are diminished constant concentration or sedation and neck rigidity to suggest meningitis.   He is not in the geographical area or has visited the areas for [place] fever either.  Other differential could be secondary gonorrhea, syphilis rash as well so those should be screened because of his history of multiple unprotected sexual encounters we should screen for other STDs to including HIV hep C hep B.  We will do the basic work-up and then add on the tickborne testing if the preliminary tests are not helpful.  Due to cost.  As of now he should stay a week off work I recommend he do bedrest and take ibuprofen around-the-clock.  Detailed viral serologies not needed due to cost as well as it will not .  We will check for endorgan problems in his white count platelet count liver tests.  - C-Reactive Protein(CRP)  - HIV Antigen/Antibody Screening [facility]  - HM1(CBC and Differential)  - Basic Metabolic Panel  - AST (SGOT)  - ALT (SGPT)  - Chlamydia trachomatis & Neisseria gonorrhoeae, Amplified Detection  - Urinalysis-UC if Indicated  - Lyme Antibody [place]  -  "Hepatitis B Surface Antigen (HBsAG)  - Hepatitis C Antibody (Anti-HCV)  - Syphilis Screen, [facility]  - Valproic Acid (Depakene )  - HM1 (CBC with Diff)  - Parvovirus B19 Antibodies, IgG and IgM            Return in about 2 weeks (around 8/19/2019) for symptoms that worsen or do not improve, with your primary provider.     History of Present Illness   This 44 y.o. old   Chief Complaint   Patient presents with     Establish Care     .Back pain, head and neck pain, fever x 5 days (99 - 102), off balance, rash on lower abdomen that goes around to the back, chills, taking Ibuprofen and Tylenol     He has no cough, cold no sneezing, cold sore throat sinus pain ear pain no headache or neck pain specifically he can move his neck in all directions.  He says he feels buzzed and a little lightheaded but concentration and focus is sharp he feels dehydrated.  But he has no diarrhea no dysuria no actual joint swelling no urethral discharge.    Body aches , generalizedrash this monring .sleeping a lot , works at hardware store , acting , has been using Tylenol and ibuprofen and that could be suppressing the fever.  He used to be a habitual marijuana and alcohol user but has been off alcohol for more than 20 years he did relapse on marijuana a few months ago and tried some crack cocaine last week but no other drugs.  He has had no recent travel other than [place] and he did not have any unprotected sex in the last 1 year.  But has had several encounters before   Review of Systems: A comprehensive review of systems was negative except as noted.     Medications, Allergies and Problem List   Reviewed, reconciled and updated  Patient Active Problem List   Diagnosis     Bipolar disorder, in partial remission, most recent episode hypomanic (H)        Physical Exam   General Appearance:       BP 96/70 (Patient Site: Left Arm, Patient Position: Sitting, Cuff Size: Adult Large)   Pulse 93   Ht 5' 8\" (1.727 m)   Wt 180 lb (81.6 kg)   " SpO2 99%   BMI 27.37 kg/m      General appearance - alert, well appearing, and in no distress  Mental status - alert, oriented to person, place, and time  Neck - supple, no significant adenopathy  Lymphatics - no palpable lymphadenopathy, no hepatosplenomegaly  Chest - clear to auscultation, no wheezes, rales or rhonchi, symmetric air entry there is some scattered coarse cramps  Heart - normal rate, regular rhythm, normal S1, S2, no murmurs, rubs, clicks or gallops  Musculoskeletal - no joint tenderness, deformity or swelling  Extremities - peripheral pulses normal, no pedal edema, no clubbing or cyanosis  Skin - normal coloration and turgor, no rashes, no suspicious skin lesions noted  External genitalia his penis is uncircumcised but there is no genital ulcers testes size is normal there is no groin lymphadenopathy.  No axillary lymphadenopathy                                                                                     Additional Information   Current Outpatient Medications   Medication Sig Dispense Refill     divalproex (DEPAKOTE DR) 500 MG 12 hour tablet Take 3 tablets by mouth at bedtime.  1     No current facility-administered medications for this visit.      Allergies   Allergen Reactions     Penicillins Rash     Sulfa (Sulfonamide Antibiotics) Rash     Social History     Social History Narrative     Not on file      reports that he has quit smoking. He has quit using smokeless tobacco. His alcohol and drug histories are not on file.   No past medical history on file.        Time: total time spent with the patient was 25 minutes of which >50% was spent in counseling and coordination of care     Sonja Sorto MD

## 2021-06-03 VITALS — WEIGHT: 180 LBS | BODY MASS INDEX: 27.28 KG/M2 | HEIGHT: 68 IN

## 2021-06-04 VITALS
OXYGEN SATURATION: 100 % | WEIGHT: 164 LBS | HEART RATE: 112 BPM | HEIGHT: 68 IN | DIASTOLIC BLOOD PRESSURE: 74 MMHG | BODY MASS INDEX: 24.86 KG/M2 | SYSTOLIC BLOOD PRESSURE: 120 MMHG

## 2021-06-05 NOTE — PROGRESS NOTES
Office Visit - Physical   Mendy Feliz   44 y.o.  male    Date of visit: 1/17/2020  Physician: Sonja Sorto MD     Assessment and Plan   1. Routine history and physical examination of adult  Should get annual HIV testing .   - Lipid [place] FASTING  - Basic Metabolic Panel  - Thyroid Stimulating Hormone (TSH)  - HIV Antigen/Antibody Screening Cascade  - Hepatitis B Surface Antigen (HBsAG)  - Hepatitis C Antibody (Anti-HCV)  - AST (SGOT)  - ALT (SGPT)    2. Substance abuse (H)  Multiple addictions , has tried everything except IV drugs including crack , cocaine , current addiction is meth . Is also a binge drinker and smokes cigarettes . He says he hates alcohol and 'downers' but meth makes him 'feel like himself', he doesn't think its harmful to him like the other drugs and he thinks he will never quit. He is willing to take his depakote but not stop his meth . I did tell him that there are physical risks including risks to his cardiovascular system . He already has some significant tachycardia  He is willing to go and see a chemical dependency specialist   - Ambulatory referral to Chemical Dependency    3. Bipolar disorder, in partial remission, most recent episode hypomanic (H)  When he is on depakote his mood is fairly stable . His mother has been hand giving him his meds. He is very close to his mother and brothers but feel they do not understand him and he has always been different. He is convinced he can be a famous actor and he wants to continue working on that dream . His speech is pressured and occasionally tangential . There are grandiose elements . He is not suicidal or depressed but in a hypomanic state . I did convince him to continue taking the depakote and reassured him that it does not cause erectile dysfunction . I do feel he may need abilify  Or risperidal as well . I do think it will be hard to convince him to take those. His only real fear is getting physical illness, trouble with  "the law and maybe hurting his mother . 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 Lives with his mother on and off .         Past Medical History   No past medical history on file.    Patient Active Problem List    Diagnosis Date Noted     Substance abuse (H) 01/17/2020     Bipolar disorder, in partial remission, most recent episode hypomanic (H) 08/05/2019     Overview Note:     After three manic episodes . Chronic use              Past Surgical History  He has no past surgical history on file.     History of Present Illness   This 44 y.o. old  Here for physical and discuss mental health   Review of Systems: A comprehensive review of systems was negative except as noted.     Medications and Allergies   Current Outpatient Medications   Medication Sig Dispense Refill     divalproex (DEPAKOTE DR) 500 MG 12 hour tablet Take 3 tablets by mouth at bedtime.  1     No current facility-administered medications for this visit.      Allergies   Allergen Reactions     Penicillins Rash     Sulfa (Sulfonamide Antibiotics) Rash        Family and Social History   No family history on file.     Social History     Tobacco Use     Smoking status: Former Smoker     Smokeless tobacco: Former User   Substance Use Topics     Alcohol use: Not on file     Drug use: Not on file          Physical Exam   General Appearance:       /74 (Patient Site: Left Arm, Patient Position: Sitting, Cuff Size: Adult Large)   Pulse (!) 112   Ht 5' 8\" (1.727 m)   Wt 164 lb (74.4 kg)   SpO2 100%   BMI 24.94 kg/m      EYES: Eyelids, conjunctiva, and sclera were normal. Pupils were normal. Cornea, iris, and lens were normal bilaterally. Some mild exopthalmos   HEAD, EARS, NOSE, MOUTH, AND THROAT: Head and face were normal. Hearing was normal to voice and the ears were " normal to external exam. Nose appearance was normal and there was no discharge. Oropharynx was normal.  NECK: Neck appearance was normal. There were no neck masses and the thyroid was not enlarged.  RESPIRATORY: Breathing pattern was normal and the chest moved symmetrically.  Percussion/auscultatory percussion was normal.  Lung sounds were normal and there were no abnormal sounds.  CARDIOVASCULAR: Heart rate and rhythm were normal.  S1 and S2 were normal and there were no extra sounds or murmurs. Peripheral pulses in arms and legs were normal.  Jugular venous pressure was normal.  There was no peripheral edema.  GASTROINTESTINAL: The abdomen was normal in contour.  Bowel sounds were present.  Percussion detected no organ enlargement or tenderness.  Palpation detected no tenderness, mass, or enlarged organs.   MUSCULOSKELETAL: Skeletal configuration was normal and muscle mass was normal for age. Joint appearance was overall normal.  LYMPHATIC: There were no enlarged nodes.  SKIN/HAIR/NAILS: Skin color was normal.  There were no skin lesions.  Hair and nails were normal.  NEUROLOGIC: The patient was alert and oriented to person, place, time, and circumstance. Speech was normal. Cranial nerves were normal. Motor strength was normal for age. The patient was normally coordinated.  PSYCHIATRIC:  Mood and affect were pressured, tangential , at times grandiose, he does make good eye contact but is restless but pleasant and not angry or aggressive  and the patient had normal recent and remote memory. The patient's judgment and insight were impaired .       Additional Information        Sonja Sorto MD  Internal Medicine  Contact me at [phone]

## 2021-06-05 NOTE — TELEPHONE ENCOUNTER
Test Results  Who is calling?:  Patient   Who ordered the test:  Sonja Sorto MD  Type of test: Lab  Date of test:  01/17/2020  Where was the test performed:  [facility]   What are your questions/concerns?:  Patient requested for results writer relayed Providers message below .  Okay to leave a detailed message?:  No   The electrolytes, kidney tests are all normal .   Liver tests are normal . Sexually transmitted disease testing was negative . thrid tests ar normal . Blood counts are normal . Cholesterol is good . Overall good results      Please call with questions or contact us using [employer].     Sincerely,          Electronically signed by Sonja Sorto MD

## 2021-06-09 NOTE — PROGRESS NOTES
"Mendy Feliz is a 45 y.o. male who is being evaluated via a billable telephone visit.      The patient has been notified of following:     \"This telephone visit will be conducted via a call between you and your physician/provider. We have found that certain health care needs can be provided without the need for a physical exam.  This service lets us provide the care you need with a short phone conversation.  If a prescription is necessary we can send it directly to your pharmacy.  If lab work is needed we can place an order for that and you can then stop by our lab to have the test done at a later time.    Telephone visits are billed at different rates depending on your insurance coverage. During this emergency period, for some insurers they may be billed the same as an in-person visit.  Please reach out to your insurance provider with any questions.    If during the course of the call the physician/provider feels a telephone visit is not appropriate, you will not be charged for this service.\"    Patient has given verbal consent to a Telephone visit? Yes    What phone number would you like to be contacted at? [phone]  Chief Complaint   Patient presents with     Follow-up     Needs meds refilled     Actively smoking meth , not injecting . Openly admits he is an addict . Also living a very unhealthy life.  Currently sleeping in the garage in his mother's condo building.  He is trying to avoid meeting her because of COVID.  Can sometimes uses the bathroom.  He is not himself is not taking any precautions for COVID is not wearing the mask is meeting people and socializing as usual.  He strongly believes that he knows his own body that he is admits that he is a narcissist but he is enjoying his life too much because meth makes him feel extremely superior and gives him his very strong sense of wellbeing and empowerment and without it he cannot seem to think of her life at all as being many meaningful.  He does " however admit that he is going to burnout eventually but he tries to avoid thinking about that.  He continues to smoke cigarettes and binge drink as well.  He continues to have multiple sexual partners and unprotected sex.  He was in care home and I think since I last spoke to him he was also in the ER with acute psychosis.  He says he has been taking his Depakote but has been out of it for a week or 2 with at periodic times.  Patient would like to receive their AVS by AVS Preference: [facility].    Additional provider notes:     Assessment/Plan:  1. Bipolar affective disorder, remission status unspecified (H)  Severe bipolar disorder and currently in a constant state of hypomania.  That is exacerbated by mass.  He is has some insight and is compliant with or willing to take the Depakote.  He does not want to see psychiatrist because there is he says there is nothing that they can do for him other than give him the pills.  He is not suicidal.  - divalproex (DEPAKOTE DR) 500 MG 12 hour tablet; Take 3 tablets (1,500 mg total) by mouth at bedtime.  Dispense: 270 tablet; Refill: 3    2. Methamphetamine abuse (H)  His biggest issue is his addiction to meth.  I did tell him that he will burnout and have a premature death due to this drug.  And he said he is completely aware of that and but has tried and failed multiple outpatient addiction treatment clinics.  He admits that he is not narcissist and and feels so good on the meds that he cannot foresee life without it.  However he says he is always open to addiction counseling because of his mother and his family.  - AMB REFERRAL TO MENTAL HEALTH AND ADDICTION  - Adult (18+); Outpatient Treatment; Chemical Dependency Treatment;  [facility] [phone]    3. Encounter for laboratory testing for COVID-19 virus  I will test him for an antibody to see if he is immune from having acquired it and being asymptomatic in the past.He was recently tested a few weeks  ago for COVID PCR otherwise given his high risk behavior he does qualify  4. Screen for STD (sexually transmitted disease)  He clearly knows that he has to have protected sex is aware but he says he has unprotected sex when he is irrational or on the high due to drugs.  I did say that he should get tested every 6 months if he is not going to use protection.  - Hepatitis C Antibody (Anti-HCV); Future  - Hepatitis B Surface Antigen (HBsAG); Future  - HIV Antigen/Antibody Screening Cascade; Future  - Syphilis Screen, Cascade; Future  - Chlamydia trachomatis & Neisseria gonorrhoeae, Amplified Detection; Future        Phone call duration:  30 minutes    EZEQUIEL ARMAS

## 2021-06-20 NOTE — LETTER
Letter by Sonja Sorto MD at      Author: Sonja Sorto MD Service: -- Author Type: --    Filed:  Encounter Date: 1/21/2020 Status: Signed         Iype DENISHA Feliz  [address]  [place] [address]             January 21, 2020         Dear Mr. Feliz,    Below are the results from your recent visit:    Resulted Orders   Lipid [place] FASTING   Result Value Ref Range    Cholesterol 180 <=199 mg/dL    Triglycerides 60 <=149 mg/dL    HDL Cholesterol 75 >=40 mg/dL    LDL Calculated 93 <=129 mg/dL    Patient Fasting > 8hrs? No    Basic Metabolic Panel   Result Value Ref Range    Sodium 141 136 - 145 mmol/L    Potassium 4.3 3.5 - 5.0 mmol/L    Chloride 104 98 - 107 mmol/L    CO2 24 22 - 31 mmol/L    Anion Gap, Calculation 13 5 - 18 mmol/L    Glucose 99 70 - 125 mg/dL    Calcium 9.3 8.5 - 10.5 mg/dL    BUN 23 (H) 8 - 22 mg/dL    Creatinine 0.77 0.70 - 1.30 mg/dL    GFR MDRD Af Amer >60 >60 mL/min/1.73m2    GFR MDRD Non Af Amer >60 >60 mL/min/1.73m2    Narrative    Fasting Glucose reference range is 70-99 mg/dL per  American Diabetes Association (ADA) guidelines.   Thyroid Stimulating Hormone (TSH)   Result Value Ref Range    TSH 0.81 0.30 - 5.00 uIU/mL   HIV Antigen/Antibody Screening Cascade   Result Value Ref Range    HIV Antigen / Antibody Negative Negative    Narrative    Method is Abbott HIV Ag/Ab for the detection of HIV p24 antigen, HIV-1 antibodies and HIV-2 antibodies.   Hepatitis B Surface Antigen (HBsAG)   Result Value Ref Range    Hepatitis B Surface Ag Negative Negative   Hepatitis C Antibody (Anti-HCV)   Result Value Ref Range    Hepatitis C Ab Negative Negative   AST (SGOT)   Result Value Ref Range    AST 32 0 - 40 U/L   ALT (SGPT)   Result Value Ref Range    ALT 18 0 - 45 U/L       The electrolytes, kidney tests are all normal .   Liver tests are normal . Sexually transmitted disease testing was negative . thrid tests ar normal . Blood counts are normal . Cholesterol is good .  Overall good results     Please call with questions or contact us using [employer].    Sincerely,        Electronically signed by Sonja Sorto MD

## 2021-07-18 ENCOUNTER — HEALTH MAINTENANCE LETTER (OUTPATIENT)
Age: 46
End: 2021-07-18

## 2021-09-12 ENCOUNTER — HEALTH MAINTENANCE LETTER (OUTPATIENT)
Age: 46
End: 2021-09-12

## 2021-11-16 DIAGNOSIS — F31.73 BIPOLAR DISORDER, IN PARTIAL REMISSION, MOST RECENT EPISODE MANIC (H): Primary | ICD-10-CM

## 2021-11-16 RX ORDER — DIVALPROEX SODIUM 500 MG/1
TABLET, DELAYED RELEASE ORAL
Qty: 120 TABLET | Refills: 4 | Status: SHIPPED | OUTPATIENT
Start: 2021-11-16 | End: 2023-04-23

## 2022-06-14 ENCOUNTER — HOSPITAL ENCOUNTER (EMERGENCY)
Facility: CLINIC | Age: 47
Discharge: HOME | End: 2022-06-15
Attending: EMERGENCY MEDICINE | Admitting: EMERGENCY MEDICINE
Payer: COMMERCIAL

## 2022-06-14 DIAGNOSIS — F12.90 MARIJUANA USE: ICD-10-CM

## 2022-06-14 DIAGNOSIS — F31.10 BIPOLAR AFFECTIVE DISORDER, CURRENT EPISODE MANIC, CURRENT EPISODE SEVERITY UNSPECIFIED (H): ICD-10-CM

## 2022-06-14 DIAGNOSIS — F19.959 DRUG-INDUCED PSYCHOTIC DISORDER WITH COMPLICATION (H): ICD-10-CM

## 2022-06-14 PROCEDURE — 99285 EMERGENCY DEPT VISIT HI MDM: CPT | Mod: 25

## 2022-06-14 PROCEDURE — 250N000011 HC RX IP 250 OP 636: Performed by: EMERGENCY MEDICINE

## 2022-06-14 PROCEDURE — 96372 THER/PROPH/DIAG INJ SC/IM: CPT | Performed by: EMERGENCY MEDICINE

## 2022-06-14 PROCEDURE — 250N000013 HC RX MED GY IP 250 OP 250 PS 637: Performed by: EMERGENCY MEDICINE

## 2022-06-14 RX ORDER — LORAZEPAM 2 MG/1
2 TABLET ORAL
Status: COMPLETED | OUTPATIENT
Start: 2022-06-14 | End: 2022-06-15

## 2022-06-14 RX ORDER — OLANZAPINE 10 MG/1
10 TABLET, ORALLY DISINTEGRATING ORAL ONCE
Status: COMPLETED | OUTPATIENT
Start: 2022-06-14 | End: 2022-06-14

## 2022-06-14 RX ORDER — OLANZAPINE 10 MG/1
10 TABLET, ORALLY DISINTEGRATING ORAL
Status: DISCONTINUED | OUTPATIENT
Start: 2022-06-14 | End: 2022-06-15 | Stop reason: HOSPADM

## 2022-06-14 RX ORDER — OLANZAPINE 10 MG/2ML
10 INJECTION, POWDER, FOR SOLUTION INTRAMUSCULAR DAILY PRN
Status: DISCONTINUED | OUTPATIENT
Start: 2022-06-14 | End: 2022-06-15 | Stop reason: HOSPADM

## 2022-06-14 RX ADMIN — OLANZAPINE 10 MG: 10 INJECTION, POWDER, FOR SOLUTION INTRAMUSCULAR at 23:19

## 2022-06-14 RX ADMIN — OLANZAPINE 10 MG: 10 TABLET, ORALLY DISINTEGRATING ORAL at 22:35

## 2022-06-15 VITALS
WEIGHT: 184 LBS | HEIGHT: 68 IN | DIASTOLIC BLOOD PRESSURE: 61 MMHG | SYSTOLIC BLOOD PRESSURE: 108 MMHG | BODY MASS INDEX: 27.89 KG/M2 | RESPIRATION RATE: 16 BRPM | OXYGEN SATURATION: 96 % | TEMPERATURE: 99.8 F | HEART RATE: 83 BPM

## 2022-06-15 PROCEDURE — 90791 PSYCH DIAGNOSTIC EVALUATION: CPT

## 2022-06-15 PROCEDURE — 250N000013 HC RX MED GY IP 250 OP 250 PS 637: Performed by: EMERGENCY MEDICINE

## 2022-06-15 RX ADMIN — LORAZEPAM 2 MG: 2 TABLET ORAL at 00:55

## 2022-06-15 NOTE — ED NOTES
Bed: ED17  Expected date: 6/14/22  Expected time: 10:14 PM  Means of arrival: Ambulance  Comments:  Alma Rosa [id number] agitated/on hold

## 2022-06-15 NOTE — ED NOTES
Pt able to calmly verbalize understanding of behavioral control needed to stay out of restraints. Pt given additional medication and released from restraints. Pt currently resting in on cart

## 2022-06-15 NOTE — ED PROVIDER NOTES
Signout taken from Dr. Roge Davila.  He has been medically cleared, although he has required medication for agitation.  He is currently on an CHELSIE.  He is to undergo a DEC assessment and disposition will be determined by the DEC .     Bo Escobar MD  06/15/22 [id number]
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
## 2022-06-15 NOTE — ED PROVIDER NOTES
"[facility] ED Behavioral Health Handoff Note:     Brief HPI:  This is a 47 year old male signed out to me by Dr. Carreno .  See initial ED Provider note for details of the presentation.     Patient is medically cleared for admission to a Behavioral Health unit.      Pending studies include UDS and COVID.      The patient is on a hold.  The type of hold is CHELSIE.      The patient has required medication for agitation.    Exam:   Patient Vitals for the past 24 hrs:   BP Temp Temp src Pulse SpO2 Height Weight   06/15/22 0001 108/61 -- -- 83 96 % -- --   06/14/22 2349 104/71 -- -- 89 96 % -- --   06/14/22 2257 117/82 -- -- 82 -- -- --   06/14/22 2221 115/73 99.8  F (37.7  C) Temporal 87 98 % 1.727 m (5' 8\") 83.5 kg (184 lb)     General: Resting comfortably on the gurney  Head:  The scalp, face, and head appear normal  Eyes:  The pupils are normal    Conjunctivae and sclera appear normal  ENT:    The nose is normal    Ears/pinnae are normal  Neck:  Normal range of motion  MS:  No deformities.   Skin:  No rash or lesions noted.  Neuro: Speech is normal and fluent  Psych:  Awake. Alert.  Normal affect.      Appropriate interactions    ED Course:  There were no significant events while under my care.      DEC is starting at 1330.  Will need to follow up on DEC recommendations regarding manic/hypomanic behavior.  Not EMPATH appropriate due to hypersexual-inappropriate interactions and comments to staff and other patient's historically.      Patient was signed out to the oncoming provider. Dr. Escobar pending DEC assessment and final recommendations.     Impression:    ICD-10-CM    1. Marijuana use  F12.90    2. Bipolar affective disorder, current episode manic, current episode severity unspecified (H)  F31.10      Plan:    1. Await DEC assessment and final disposition.     RESULTS:   No results found for this visit on 06/14/22 (from the past 24 hour(s)).    MD Lola Borja Scott, MD  06/15/22 1333    "

## 2022-06-15 NOTE — DISCHARGE INSTRUCTIONS
Aftercare Plan    Follow up with [facility]    Take your medications    Remain sober    Follow up with established providers and supports as scheduled. Continue taking medications as prescribed. Abstain from drugs and alcohol. Utilize your [facility] mental health crisis team as needed. They are available 24/7. Contact information is listed below.     The following appointment(s) and/or referral(s) were made on your behalf. If you need to make changes or cancel please contact the clinic/provider directly.     If I am feeling unsafe or I am in a crisis, I will:   Contact my established care providers   Call the [place] Suicide Prevention Lifeline: 861.301.8699   Go to the nearest emergency room   Call 91     Warning signs that I or other people might notice when a crisis is developing for me: changes to sleep, appetite or mood, increased anger, agitation or irritability, feeling depressed or hopeless, spending more time alone or talking less, increased crying, decreased productivity, seeing or hearing things that aren't there, thoughts of not wanting to live anymore or of actually killing myself, thoughts of hurting others    Things I am able to do on my own to cope or help me feel better: watching a favorite tv show or movie, listening to music I enjoy, going outside and breathing fresh air, going for a walk or exercising, taking a shower or bath, a cold or hot beverage, a healthy snack, drawing/coloring/painting, journaling, singing or dancing, deep breathing     I can try practicing square breathing when I begin to feel anxious - inhale through the nose for the count of 4 and the first line on the square. Exhale through the mouth for the count of 4 for the second line of the square. Repeat to complete the square. Repeat the square as many times as needed.    I can also use my five senses to practice mindfulness and grounding. What are five things I can see, four things I can hear, three things I can feel, two  things I can smell, and one thing I can taste.     Things that I am able to do with others to cope or help me feel better: sometimes just talking or spending time with someone else, sharing a meal or having coffee, watching a movie or playing a game, going for a walk or exercising    I can also use community resources including mental health hotlines, [facility] crisis teams, or apps.     Things I can use or do for distraction: movies/tv, music, reading, games, drawing/coloring/painting or other art, essential oils, exercise, cleaning/organizing, puzzles, crossword puzzles, word search, Sudoku       I can also download a meditation or relaxation juan jose, like Calm, Headspace, or Insight Timer (all three offer a free version)    Changes I can make to support my mental health and wellness: Attend scheduled mental health therapy and psychiatric appointments. Take my medications as prescribed. Maintain a daily schedule/routine. Abstain from all mood altering substances, including drugs, alcohol, or medications not currently prescribed to me. Implement a self-care routine.      People in my life that I can ask for help: friends or family, trusted teachers/staff/colleagues, trusted members of my community or place of [place], mental health crisis lines, or 911    Your [facility] has a mental health crisis team you can call 24/7: [facility] Adult, [phone]    Other things that are important when I m in crisis: to remember that the feelings I am having right now are temporary, and it won't feel like this forever, and that it is okay and important to ask for help    Crisis Lines  Crisis Text Line  Text 511042  You will be connected with a trained live crisis counselor to provide support.    Por espanol, texto  TING a 461172 o texto a 442-AYUDAME en WhatsApp    National Hope Line  1.800.SUICIDE [2747957]      Community Resources  Fast Tracker  Linking people to mental health and substance use disorder resources   "noodlsckermn.Pluss Polymers     [place] Mental Health Warm Line  Peer to peer support  Monday thru Saturday, 12 pm to 10 pm  632.030.3522 or 7.845.477.7764  Text \"Support\" to 84979    National [place] on Mental Illness (LASHAE)  709.961.6996 or 1.888.LASHAE.HELPS      Mental Health Apps  My3  https://FirstString.org/    VirtualHopeBox  https://Tripda/apps/virtual-hope-box/      Additional Information  Today you were seen by a licensed mental health professional through Triage and Transition services, [facility] ([facility])  for a crisis assessment in the Emergency Department at [facility].  It is recommended that you follow up with your established providers (psychiatrist, mental health therapist, and/or primary care doctor - as relevant) as soon as possible. Coordinators from [facility] will be calling you in the next 24-48 hours to ensure that you have the resources you need.  You can also contact [facility] coordinators directly at [phone]. You may have been scheduled for or offered an appointment with a mental health provider. [facility] maintains an extensive network of licensed behavioral health providers to connect patients with the services they need.  We do not charge providers a fee to participate in our referral network.  We match patients with providers based on a patient's specific needs, insurance coverage, and location.  Our first effort will be to refer you to a provider within your care system, and will utilize providers outside your care system as needed.        "

## 2022-06-15 NOTE — ED NOTES
Rounded on pt no signs of injury from restraints pt demanding to be taken out of restraints but does not comprehend the behavior and cessation of verbal threats requirement in order to be released from restraints.

## 2022-08-14 ENCOUNTER — HEALTH MAINTENANCE LETTER (OUTPATIENT)
Age: 47
End: 2022-08-14

## 2022-11-19 ENCOUNTER — HEALTH MAINTENANCE LETTER (OUTPATIENT)
Age: 47
End: 2022-11-19

## 2022-11-29 DIAGNOSIS — F31.9 BIPOLAR AFFECTIVE DISORDER, REMISSION STATUS UNSPECIFIED (H): Primary | ICD-10-CM

## 2022-11-29 RX ORDER — DIVALPROEX SODIUM 500 MG/1
1500 TABLET, DELAYED RELEASE ORAL DAILY
Qty: 90 TABLET | Refills: 11 | Status: SHIPPED | OUTPATIENT
Start: 2022-11-29 | End: 2023-04-21

## 2023-04-21 ENCOUNTER — HOSPITAL ENCOUNTER (OUTPATIENT)
Facility: CLINIC | Age: 48
Setting detail: OBSERVATION
Discharge: HOME | End: 2023-04-24
Attending: EMERGENCY MEDICINE | Admitting: EMERGENCY MEDICINE
Payer: COMMERCIAL

## 2023-04-21 DIAGNOSIS — F31.73 BIPOLAR DISORDER, IN PARTIAL REMISSION, MOST RECENT EPISODE MANIC (H): ICD-10-CM

## 2023-04-21 DIAGNOSIS — F31.2 SEVERE MANIC BIPOLAR 1 DISORDER WITH PSYCHOTIC BEHAVIOR (H): ICD-10-CM

## 2023-04-21 DIAGNOSIS — F15.21 METHAMPHETAMINE USE DISORDER, SEVERE, IN EARLY REMISSION (H): ICD-10-CM

## 2023-04-21 DIAGNOSIS — F22 PARANOIA (H): Primary | ICD-10-CM

## 2023-04-21 LAB
ALBUMIN SERPL BCG-MCNC: 4.6 G/DL (ref 3.5–5.2)
ALP SERPL-CCNC: 64 U/L (ref 40–129)
ALT SERPL W P-5'-P-CCNC: 19 U/L (ref 10–50)
ANION GAP SERPL CALCULATED.3IONS-SCNC: 9 MMOL/L (ref 7–15)
AST SERPL W P-5'-P-CCNC: 17 U/L (ref 10–50)
BILIRUB SERPL-MCNC: 0.4 MG/DL
BUN SERPL-MCNC: 17.4 MG/DL (ref 6–20)
CALCIUM SERPL-MCNC: 9.5 MG/DL (ref 8.6–10)
CHLORIDE SERPL-SCNC: 101 MMOL/L (ref 98–107)
CREAT SERPL-MCNC: 0.93 MG/DL (ref 0.67–1.17)
DEPRECATED HCO3 PLAS-SCNC: 27 MMOL/L (ref 22–29)
GFR SERPL CREATININE-BSD FRML MDRD: >90 ML/MIN/1.73M2
GLUCOSE SERPL-MCNC: 103 MG/DL (ref 70–99)
POTASSIUM SERPL-SCNC: 4.4 MMOL/L (ref 3.4–5.3)
PROT SERPL-MCNC: 7.4 G/DL (ref 6.4–8.3)
SARS-COV-2 RNA RESP QL NAA+PROBE: NEGATIVE
SODIUM SERPL-SCNC: 137 MMOL/L (ref 136–145)
VALPROATE SERPL-MCNC: <2.8 UG/ML

## 2023-04-21 PROCEDURE — 90791 PSYCH DIAGNOSTIC EVALUATION: CPT

## 2023-04-21 PROCEDURE — 80164 ASSAY DIPROPYLACETIC ACD TOT: CPT | Performed by: PSYCHIATRY & NEUROLOGY

## 2023-04-21 PROCEDURE — 36415 COLL VENOUS BLD VENIPUNCTURE: CPT | Performed by: PSYCHIATRY & NEUROLOGY

## 2023-04-21 PROCEDURE — 250N000013 HC RX MED GY IP 250 OP 250 PS 637: Performed by: PSYCHIATRY & NEUROLOGY

## 2023-04-21 PROCEDURE — 80053 COMPREHEN METABOLIC PANEL: CPT | Performed by: PSYCHIATRY & NEUROLOGY

## 2023-04-21 PROCEDURE — G0378 HOSPITAL OBSERVATION PER HR: HCPCS

## 2023-04-21 PROCEDURE — 99285 EMERGENCY DEPT VISIT HI MDM: CPT | Mod: 25

## 2023-04-21 PROCEDURE — U0005 INFEC AGEN DETEC AMPLI PROBE: HCPCS | Performed by: EMERGENCY MEDICINE

## 2023-04-21 PROCEDURE — 99222 1ST HOSP IP/OBS MODERATE 55: CPT | Performed by: PSYCHIATRY & NEUROLOGY

## 2023-04-21 PROCEDURE — C9803 HOPD COVID-19 SPEC COLLECT: HCPCS

## 2023-04-21 RX ORDER — MAGNESIUM HYDROXIDE/ALUMINUM HYDROXICE/SIMETHICONE 120; 1200; 1200 MG/30ML; MG/30ML; MG/30ML
30 SUSPENSION ORAL EVERY 6 HOURS PRN
Status: DISCONTINUED | OUTPATIENT
Start: 2023-04-21 | End: 2023-04-24 | Stop reason: HOSPADM

## 2023-04-21 RX ORDER — ACETAMINOPHEN 325 MG/1
325-650 TABLET ORAL EVERY 4 HOURS PRN
Status: DISCONTINUED | OUTPATIENT
Start: 2023-04-21 | End: 2023-04-24 | Stop reason: HOSPADM

## 2023-04-21 RX ORDER — HYDROXYZINE HYDROCHLORIDE 50 MG/1
50 TABLET, FILM COATED ORAL 3 TIMES DAILY PRN
Status: DISCONTINUED | OUTPATIENT
Start: 2023-04-21 | End: 2023-04-24 | Stop reason: HOSPADM

## 2023-04-21 RX ORDER — OLANZAPINE 5 MG/1
5 TABLET ORAL AT BEDTIME
Status: DISCONTINUED | OUTPATIENT
Start: 2023-04-21 | End: 2023-04-23

## 2023-04-21 RX ORDER — LANOLIN ALCOHOL/MO/W.PET/CERES
3 CREAM (GRAM) TOPICAL
Status: DISCONTINUED | OUTPATIENT
Start: 2023-04-21 | End: 2023-04-24 | Stop reason: HOSPADM

## 2023-04-21 RX ORDER — OLANZAPINE 5 MG/1
5 TABLET, ORALLY DISINTEGRATING ORAL EVERY 6 HOURS PRN
Status: DISCONTINUED | OUTPATIENT
Start: 2023-04-21 | End: 2023-04-24 | Stop reason: HOSPADM

## 2023-04-21 RX ORDER — DIVALPROEX SODIUM 500 MG/1
1500 TABLET, DELAYED RELEASE ORAL AT BEDTIME
Status: DISCONTINUED | OUTPATIENT
Start: 2023-04-21 | End: 2023-04-24 | Stop reason: HOSPADM

## 2023-04-21 RX ADMIN — OLANZAPINE 5 MG: 5 TABLET, FILM COATED ORAL at 22:03

## 2023-04-21 RX ADMIN — DIVALPROEX SODIUM 1500 MG: 500 TABLET, DELAYED RELEASE ORAL at 22:49

## 2023-04-21 ASSESSMENT — COLUMBIA-SUICIDE SEVERITY RATING SCALE - C-SSRS
6. HAVE YOU EVER DONE ANYTHING, STARTED TO DO ANYTHING, OR PREPARED TO DO ANYTHING TO END YOUR LIFE?: NO
2. HAVE YOU ACTUALLY HAD ANY THOUGHTS OF KILLING YOURSELF?: NO
1. HAVE YOU WISHED YOU WERE DEAD OR WISHED YOU COULD GO TO SLEEP AND NOT WAKE UP?: NO
TOTAL  NUMBER OF ABORTED OR SELF INTERRUPTED ATTEMPTS LIFETIME: NO
ATTEMPT LIFETIME: NO
TOTAL  NUMBER OF INTERRUPTED ATTEMPTS LIFETIME: NO

## 2023-04-21 ASSESSMENT — ENCOUNTER SYMPTOMS
NAUSEA: 0
BACK PAIN: 0
COLOR CHANGE: 0
EYE PAIN: 0
FEVER: 0
NECK PAIN: 0
COUGH: 0
CHILLS: 0
DYSURIA: 0
HALLUCINATIONS: 1
HEMATURIA: 0
PALPITATIONS: 0
ABDOMINAL PAIN: 0
DIZZINESS: 0
NERVOUS/ANXIOUS: 1
RHINORRHEA: 0
SHORTNESS OF BREATH: 0
VOMITING: 0
HEADACHES: 0

## 2023-04-21 ASSESSMENT — ACTIVITIES OF DAILY LIVING (ADL)
ADLS_ACUITY_SCORE: 35

## 2023-04-21 NOTE — ED PROVIDER NOTES
History     Chief Complaint:  Psychiatric Evaluation     History provided by patient and his mother.    HPI   Mendy Feliz is a 48 year old male with a history of methamphetamine abuse and bipolar affective disorder who presents with increasing paranoia. Patient has been off of methamphetamine for at least 6 months and has underwent inpatient treatment and now outpatient treatment. Patient states that he is concerned he is having long term affects from the meth as he has continued paranoia and seeing shadowy figures. Patient denies any current substance use. Patient states since meeting with his probation office 2 weeks ago, he has had increased paranoia. Patient denies SI/HI. He has no other medical concerns.  He states that recent sequence of events have become overwhelming for him.    Independent Historian:   Patient and mother    Review of External Notes: 4/13/23 telemedicine behavioral health note.     ROS:  Review of Systems   Constitutional: Negative for chills and fever.   HENT: Negative for congestion and rhinorrhea.    Eyes: Negative for pain and visual disturbance.   Respiratory: Negative for cough and shortness of breath.    Cardiovascular: Negative for chest pain and palpitations.   Gastrointestinal: Negative for abdominal pain, nausea and vomiting.   Genitourinary: Negative for dysuria and hematuria.   Musculoskeletal: Negative for back pain and neck pain.   Skin: Negative for color change and pallor.   Neurological: Negative for dizziness and headaches.   Psychiatric/Behavioral: Positive for hallucinations. Negative for suicidal ideas. The patient is nervous/anxious.         Paranoia       Allergies:  Shrimp  Penicillins  Sulfa Drugs     Medications:    acetaminophen (TYLENOL) 325 MG tablet  alum & mag hydroxide-simethicone (MAALOX) 200-200-20 MG/5ML SUSP suspension  divalproex sodium delayed-release (DEPAKOTE) 500 MG  tablet  divalproex sodium delayed-release (DEPAKOTE) 500 MG   "tablet  guaiFENesin (ROBITUSSIN) 20 mg/mL SOLN solution  ibuprofen (ADVIL/MOTRIN) 200 MG tablet  loratadine (CLARITIN) 10 MG tablet  melatonin 3 MG tablet  phenol-menthol (CEPASTAT) 14.5 MG lozenge  senna-docusate (SENOKOT-S/PERICOLACE) 8.6-50 MG tablet        Past Medical History:    Past Medical History:   Diagnosis Date     Bipolar affective disorder (H)        Past Surgical History:    No past surgical history on file.     Family History:    family history includes Family History Negative in his father, maternal grandmother, and mother; Substance Abuse in an other family member.    Social History:   reports that he has been smoking cigarettes. He has been smoking an average of .5 packs per day. He has quit using smokeless tobacco. He reports current alcohol use. He reports current drug use. Drugs: Amphetamines, Cocaine, Marijuana, Methamphetamines, PCP, and \"Crack\" cocaine.  PCP: No Ref-Primary, Physician     Physical Exam     Patient Vitals for the past 24 hrs:   BP Temp Pulse Resp SpO2 Height Weight   04/21/23 1327 (!) 143/86 98  F (36.7  C) 50 16 99 % 1.727 m (5' 8\") 85.3 kg (188 lb)        Physical Exam  Constitutional:       Appearance: Normal appearance.      General: Not in acute distress.  HENT:      Head: Normocephalic and atraumatic.   Eyes:      Extraocular Movements: Extraocular movements intact.      Conjunctiva/sclera: Conjunctivae normal.   Cardiovascular:      Rate and Rhythm: Normal rate and regular rhythm.   Pulmonary:      Effort: Pulmonary effort is normal. No respiratory distress.      Lungs: Clear to auscultation bilaterally.  Abdominal:      General: Abdomen is flat. There is no distension.   Musculoskeletal:         General: No swelling or deformity.      Cervical back: Normal range of motion. No rigidity.   Skin:     Coloration: Skin is not jaundiced or pale.   Neurological:      General: No focal deficit present.      Mental Status: Alert and oriented to person, place, and time. "   Psychiatric:         Mood and Affect: Mood normal.         Behavior: Behavior normal.    Emergency Department Course     Laboratory:  Labs Ordered and Resulted from Time of ED Arrival to Time of ED Departure   COVID-19 VIRUS (CORONAVIRUS) BY PCR - Normal       Result Value    SARS CoV2 PCR Negative          Emergency Department Course & Assessments:       Interventions:  Medications - No data to display     Independent Interpretation (X-rays, CTs, rhythm strip):  None    Consultations/Discussion of Management or Tests:  None        Social Determinants of Health affecting care:   Employment/Unemployment    Dis[facility]:  [facility]     Impression & Plan      Medical Decision Makin-year-old male as described above presents to the emergency department for increasing paranoia and some visual hallucinations.  Patient hemodynamically stable at time evaluation.  Afebrile.  Patient states that he has no medical complaints at this time.  Denies SI/HI.  Patient arrives to the ER today for further evaluation for his paranoia and hallucinations which mother states is triggered by recent upcoming court date and meeting with his .  Patient resting comfortably in chair with no other medical complaints.  Alert and oriented.  Does not appear to be in substance withdrawal.  No indications for lab work or imaging at this time.  Patient has negative COVID swab.  Will transfer patient to [facility] unit for psychiatric evaluation.  Discussed care plan with patient and mother who voiced understanding and agreement with plan.  Answered all questions.  Additional work-up and orders as listed in chart.    Please refer to ED course above for details on the patient's treatment course and any changes or updates in care plan beyond my initial evaluation and MDM.    Diagnosis:    ICD-10-CM    1. Paranoia (H)  F22       2. History of methamphetamine abuse (H)  F15.11            Discharge  Medications:  New Prescriptions    No medications on file      GONZALEZ ELLISON DO  4/21/2023   Gonzalez Ellison DO Yeh, Ferris, DO  04/21/23 1534

## 2023-04-21 NOTE — PLAN OF CARE
Mendy DENISHA Tray  April 21, 2023  Plan of Care Hand-off Note     Patient Care Path: Observation    Plan for Care:     Patient has been experiencing worsening symptoms of persecutory paranoia. He would like help with medication management and therapy. He has his first psychiatrist on June 6th but feels that it is too far away. He is having a heard time participating in CD tx due to paranoia. He does not have an individual therapist. He is also endorsing other symptoms of anxiety and depression including poor sleep, shame, isolating, withdrawing from friends, and anhedonia.       Critical Safety Issues: n/a    Overview:  This patient is a child/adolescent: No    This patient has additional special visitor precautions: No    Legal Status: Voluntary    Contacts:   Gloria Feliz (Mother)   [phone]    Psychiatry Consult:  n/a    Updated RN and Attending Provider regarding plan of care.    ROGERIO Grace

## 2023-04-21 NOTE — CONSULTS
"Diagnostic Evaluation Consultation  Crisis Assessment    Patient was assessed: In Person  Patient location: [facility]  Was a release of information signed: No. Reason: Pt does not recall name of future psychiatist      Referral Data and Chief Complaint  Mendy is a 48 year old, who uses he/him pronouns, and presents to the ED with family/friends. Patient is referred to the ED by community provider(s). Patient is presenting to the ED for the following concerns: paranoia, anxiety, depression.      Informed Consent and Assessment Methods     Patient is his own guardian. Writer met with patient and explained the crisis assessment process, including applicable information disclosures and limits to confidentiality, assessed understanding of the process, and obtained consent to proceed with the assessment. Patient was observed to be able to participate in the assessment as evidenced by consent and engagement. Assessment methods included conducting a formal interview with patient, review of medical records, collaboration with medical staff, and obtaining relevant collateral information from family and community providers when available..     Over the course of this crisis assessment provided reassurance, offered validation, engaged patient in problem solving and disposition planning, assisted in processing patient's thoughts and feeling relating to sobriety, spirituality, culture and provided psychoeducation. Patient's response to interventions was positive and receptive.      Summary of Patient Situation    Patient was recommended to come to the ER or an urgent care by his chemical dependency counselor after he expressed his concerns of worsening paranoia today. Pt stated that he has been sober from methamphetamines since November 24th 2022 and feels that he is still experiencing \"withdrawals that has led to paranoia.\" He reported a history of psychosis during meth use, but not when sober. He describes his current symptoms as " "\"psychosis.\" He had insight and awareness stating, \"I know I am having persecutory delusions and catastrophizing.\" He stated \" I feel like that whole world is coming to take me, hurt me.\" He stated that during group therapy, he feels as thought his peers are referencing him when they are talking about their problems.\" He stated that when he gets these feelings, he often dwells on them and \"attracts\" more of these emotions.     Additionally, pt reported having \"wet brain,\" which he described as having difficulty with memory and concentration. He reported feelings of shame and anxiety regarding charges of indecent exposure from November when using. He has been isolating and not feeling like his usual self (not working out, not talking to friends). He has also not been sleeping well.     He stated that these symptoms have worsened in the last week. He stated that he had a psych exam court date on Wednesday. At that time, he was scheduled with a psychiatrist for June 6th. He is on a wait list for individual therapy. He would like to get help sooner because he feels that he has \"gotten to the point where [he] can't function.\" Patient appeared alert, cooperative, oriented x4, and pleasant. He does not appear to be under the influence of any substances.        Brief Psychosocial History     Patient stated that he currently lives with his mother in [place]. He has been living with her on and off since 2020. Pt stated that his mother is very supportive of his mental health and sobriety. He is the youngest of three. He stated that he has two older brothers that are also supportive. He is  without children. He is currently unemployed. His last place of employment was at [employer] on March 2022. He has a goal to get a job once mental health is stable. He stated that he has friends from [employer], but he does not go to [employer]. He reported struggling a \"spiritual harris\" regarding his mental health. Patient has " "current legal stressor from November 2022 due to indecent exposure when using meth. He endorsed significant shame and distress about what he had done and said while using.     Significant Clinical History    Patient reported a previous mental health diagnosis of Bipolar 1 disorder. He reported being on Depakote since 1997. Medication is managed by PCP. Pt stated that he has \"not missed a dose\". Pt is currently in OP CD tx at [facility] since March 2022. Prior to that he was inpatient at [facility]. He currently attends tx Monday - Friday 9am-12:30pm. He also goes to additional Friday evening groups at [facility]. Pt stated that he first started smoking meth in 2021 after the end of a relationship. He reported \"shooting up\" intravenously from July - November 2022 and that is when \"things got worse.\" Pt reported a history of relapses, but stated that he has been doing a \"good job this time.\" He stated that he last used on November 24th 2022. He has a primary counselor, but no individual therapy.        Collateral Information  Writer called patient's mother, Gloria, at [phone]. Left voicemail requesting a call back.     Risk Assessment  [place] Suicide Severity Rating Scale Full Clinical Version: 4/21/2023  Suicidal Ideation  1. Wish to be Dead (Lifetime): (P) No  2. Non-Specific Active Suicidal Thoughts (Lifetime): (P) No     Suicidal Behavior  Actual Attempt (Lifetime): (P) No  Has subject engaged in non-suicidal self-injurious behavior? (Lifetime): (P) No  Interrupted Attempts (Lifetime): (P) No  Aborted or Self-Interrupted Attempt (Lifetime): (P) No  Preparatory Acts or Behavior (Lifetime): (P) No  C-SSRS Risk (Lifetime/Recent)   Calculated C-SSRS Risk Score (Lifetime/Recent): (P) No Risk Indicated         Validity of evaluation is not impacted by presenting factors during interview.   Comments regarding subjective versus objective responses to [place] tool: Pt is mood congruent  Environmental or " Psychosocial Events: legal issues such as DWI, DUI, lawsuit, CPS involvement, etc., loss of relationship due to divorce/separation, unemployment/underemployment and other life stressors  Chronic Risk Factors: history of psychiatric hospitalization, chronic and ongoing sleep difficulties and serious, persistent mental illness   Warning Signs: withdrawing from friends, family, and society and anxiety, agitation, unable to sleep, sleeping all the time  Protective Factors: strong bond to family unit, community support, or employment, responsibilities and duties to others, including pets and children, lives in a responsibly safe and stable environment, good treatment engagement, sense of importance of health and wellness, able to access care without barriers, supportive ongoing medical and mental health care relationships, help seeking, good impulse control, good problem-solving, coping, and conflict resolution skills, sense of belonging and optimistic outlook - identification of future goals  Interpretation of Risk Scoring, Risk Mitigation Interventions and Safety Plan:  No Risk indicated. Patient denied suicidal ideation. He does not have a history of suicidal concerns according to medical records. Patient does not appear to be under the influence of any substance and is motivated to remain sober. Pt has a huge sense of responsibility towards his mother.          Does the patient have thoughts of harming others? No     Is the patient engaging in sexually inappropriate behavior?  no        Current Substance Abuse     Is there recent substance abuse? no     Was a urine drug screen or blood alcohol level obtained: No       Mental Status Exam     Affect: Appropriate   Appearance: Appropriate    Attention Span/Concentration: Attentive  Eye Contact: Intense   Fund of Knowledge: Appropriate    Language /Speech Content: Fluent   Language /Speech Volume: Normal    Language /Speech Rate/Productions: Normal    Recent Memory:  Intact   Remote Memory: Intact   Mood: Anxious    Orientation to Person: Yes    Orientation to Place: Yes   Orientation to Time of Day: Yes    Orientation to Date: Yes    Situation (Do they understand why they are here?): Yes    Psychomotor Behavior: Normal    Thought Content: Paranoia   Thought Form: Goal Directed and Tangential      History of commitment: No         Medication    Psychotropic medications: Yes; depakote 1500mg; medication compliant         Current Care Team    Primary Care Provider: [facility]ishma Montiel at [facility]; Last appointment: 4/3/2023  Psychiatrist: Pt has first appointment on June 6th.   Therapist: Pt is on a wait list.   : No     CTSS or ARMHS: No  ACT Team: No  Other: OP CD tx: [facility]. Pt has a primary counselor.       Diagnosis    309.28 (F43.23) Adjustment Disorders  With mixed anxiety and depressed mood   296.50 Bipolar I Disorder Current or Most Recent Episode Depressed, unspecified - by history   Substance-Related & Addictive Disorders Stimulant Use Disorder:  In early remission  - by history      -Rule out a persistent psychotic disorder related to methamphetamine use    Clinical Summary and Substantiation of Recommendations     Patient has been experiencing worsening symptoms of persecutory paranoia. He would like help with medication management and therapy. He has his first psychiatrist on June 6th but feels that it is too far away. He is having a heard time participating in CD tx due to paranoia. He does not have an individual therapist. He is also endorsing other symptoms of anxiety and depression including poor sleep, shame, isolating, withdrawing from friends, and anhedonia.     Disposition    Recommended disposition: Observation     Reviewed case and recommendations with attending provider. Attending Name: Dr. Segura       Attending concurs with disposition: Yes       Patient and/or validated legal guardian concurs with disposition: Yes        Final disposition: Observation      Assessment Details    Patient interview started at: 4:45pm and completed at: 5:45pm.     Total duration spent on the patient case in minutes: 1.25 hrs      CPT code(s) utilized: 66563 - Psychotherapy for Crisis - 60 (30-74*) min       ROGERIO Grace, Psychotherapist  [facility] - Triage & Transition Services  Callback: [phone]

## 2023-04-21 NOTE — ED TRIAGE NOTES
Therapist recommended pt to come to [facility].  Has been through treatment for meth and no recent use.  However he states he has been feeling uncomfortable, not himself, and paranoid lately.       Triage Assessment     Row Name 04/21/23 1322       Triage Assessment (Adult)    Airway WDL WDL       Respiratory WDL    Respiratory WDL WDL       Skin Circulation/Temperature WDL    Skin Circulation/Temperature WDL WDL       Cardiac WDL    Cardiac WDL WDL       Peripheral/Neurovascular WDL    Peripheral Neurovascular WDL WDL       Cognitive/Neuro/Behavioral WDL    Cognitive/Neuro/Behavioral WDL WDL

## 2023-04-22 LAB
AMPHETAMINES UR QL SCN: NORMAL
BARBITURATES UR QL SCN: NORMAL
BENZODIAZ UR QL SCN: NORMAL
BZE UR QL SCN: NORMAL
CANNABINOIDS UR QL SCN: NORMAL
OPIATES UR QL SCN: NORMAL
PCP QUAL URINE (ROCHE): NORMAL

## 2023-04-22 PROCEDURE — G0378 HOSPITAL OBSERVATION PER HR: HCPCS

## 2023-04-22 PROCEDURE — 80307 DRUG TEST PRSMV CHEM ANLYZR: CPT | Performed by: PSYCHIATRY & NEUROLOGY

## 2023-04-22 PROCEDURE — 250N000013 HC RX MED GY IP 250 OP 250 PS 637: Performed by: PSYCHIATRY & NEUROLOGY

## 2023-04-22 PROCEDURE — 99232 SBSQ HOSP IP/OBS MODERATE 35: CPT | Performed by: PSYCHIATRY & NEUROLOGY

## 2023-04-22 RX ORDER — OLANZAPINE 5 MG/1
5 TABLET ORAL ONCE
Status: DISCONTINUED | OUTPATIENT
Start: 2023-04-22 | End: 2023-04-22

## 2023-04-22 RX ADMIN — OLANZAPINE 5 MG: 5 TABLET, ORALLY DISINTEGRATING ORAL at 16:57

## 2023-04-22 RX ADMIN — DIVALPROEX SODIUM 1500 MG: 500 TABLET, DELAYED RELEASE ORAL at 22:37

## 2023-04-22 RX ADMIN — OLANZAPINE 5 MG: 5 TABLET, FILM COATED ORAL at 22:37

## 2023-04-22 ASSESSMENT — ACTIVITIES OF DAILY LIVING (ADL)
ADLS_ACUITY_SCORE: 35

## 2023-04-22 ASSESSMENT — COLUMBIA-SUICIDE SEVERITY RATING SCALE - C-SSRS
1. SINCE LAST CONTACT, HAVE YOU WISHED YOU WERE DEAD OR WISHED YOU COULD GO TO SLEEP AND NOT WAKE UP?: NO
6. HAVE YOU EVER DONE ANYTHING, STARTED TO DO ANYTHING, OR PREPARED TO DO ANYTHING TO END YOUR LIFE?: NO
ATTEMPT SINCE LAST CONTACT: NO
SUICIDE, SINCE LAST CONTACT: NO
TOTAL  NUMBER OF INTERRUPTED ATTEMPTS SINCE LAST CONTACT: NO
TOTAL  NUMBER OF ABORTED OR SELF INTERRUPTED ATTEMPTS SINCE LAST CONTACT: NO
2. HAVE YOU ACTUALLY HAD ANY THOUGHTS OF KILLING YOURSELF?: NO

## 2023-04-22 NOTE — ED PROVIDER NOTES
[facility] Unit - Psychiatric Consultation  [facility] Emergency Department    Mendy Feliz MRN: [id number]   Age: 48 year old YOB: 1975     History     Chief Complaint   Patient presents with     Psychiatric Evaluation     HPI  Mendy Feliz is a 48 year old male with a past history notable for a bipolar disorder further complicated by a substance use disorder with prominent methamphetamine use.  He presented to the emergency department reporting concern for paranoia.  He was determined to be medically stable and transferred to the [facility] unit for psychiatric assessment.  On examination, he reports maintaining sobriety from methamphetamine since November of last year.  Despite maintaining sobriety, he notes ongoing symptoms of paranoia and ideas of reference.  Over the past 1 to 2 weeks, the intensity of the symptoms has escalated for reasons that are not clear to him.  He is concerned that someone may try to harm him or his family.  He is not certain if he can be safe on the unit because of these concerns.  He often worries that the telephone may be tapped and that others are conspiring against him.  His sleep has been poor over the past 1 week.  He denied other contributing psychosocial stressors.  He reports maintaining fair adherence to Depakote, missing 1 dose at the most over the past 1 week.  He denied suicidal and homicidal thoughts.  He denied auditory and visual hallucinations.    Today, 4/22/2023:  Patient seen on the unit today.  Patient had initially expressed a desire to return home and discharge.  During interview with this provider patient with frequent thought blocking for up to 20+ seconds at a time.  Patient also still a little disorganized in thought.  Is starting to display some insight into his paranoia.  Admitted that he does not feel safe smoking outside at his mother's house and feels like people will harm him.  He also does not feel like the electronics are safe at his  "mother's house.  He feels like people might be watching him or listening in on his conversations while he is at his mother's house.  He just wants to feel safe again.  He wants to feel himself again.  He recognizes that many of his thoughts do not seem logical but he is unable to explain and when provided education on methamphetamine he is still having difficulty connecting the dots (I believe due to his degree of psychosis at this time).  He does seem to be improving quite a bit from the other day.  Discussed staying another night for ongoing stabilization and olanzapine use to help with psychotic symptoms.  Patient is agreeable to staying.  Denies auditory and visual hallucinations.    Past Medical History  Past Medical History:   Diagnosis Date     Bipolar affective disorder (H)      No past surgical history on file.  divalproex sodium delayed-release (DEPAKOTE) 500 MG DR tablet  acetaminophen (TYLENOL) 325 MG tablet  alum & mag hydroxide-simethicone (MAALOX) 200-200-20 MG/5ML SUSP suspension      Allergies   Allergen Reactions     Shrimp Hives     Mild allergy     Penicillins Rash     Sulfa Drugs Rash     Family History  Family History   Problem Relation Age of Onset     Family History Negative Mother      Family History Negative Father      Family History Negative Maternal Grandmother      Substance Abuse Other      Social History   Social History     Tobacco Use     Smoking status: Every Day     Packs/day: 0.50     Types: Cigarettes     Smokeless tobacco: Former   Substance Use Topics     Alcohol use: Yes     Comment: Alcoholic Drinks/day: binge drinking      Drug use: Yes     Types: Amphetamines, Cocaine, Marijuana, Methamphetamines, PCP, \"Crack\" cocaine     Comment: cocaine and marijuanna      Past medical history, past surgical history, medications, allergies, family history, and social history were reviewed with the patient. No additional pertinent items.       Review of Systems  A medically appropriate " "review of systems was performed with pertinent positives and negatives noted in the HPI, and all other systems negative.    Physical Examination   BP: (!) 143/86  Pulse: 50  Temp: 98  F (36.7  C)  Resp: 16  Height: 172.7 cm (5' 8\")  Weight: 85.3 kg (188 lb)  SpO2: 99 %    Physical Exam  General: Appears stated age.   Neuro: Alert and fully oriented. Extremities appear to demonstrate normal strength on visual inspection.   Integumentary/Skin: no rash visualized, normal color    Psychiatric Examination   Appearance: awake, alert, adequately groomed and appeared as age stated  Attitude:  cooperative  Eye Contact:  intense  Mood:  better  Affect:  A little intense  Speech:  clear, coherent, some rambling  Psychomotor Behavior:  no evidence of tardive dyskinesia, dystonia, or tics  Thought Process:  disorganized and evidence of thought blocking present  Associations:  loosening of associations present  Thought Content:  no evidence of suicidal ideation or homicidal ideation, no auditory hallucinations present, no visual hallucinations present and paranioa present  Insight:  stating to have some insight into the paranioa  Judgement:  fair  Oriented to:  time, person, and place  Attention Span and Concentration:  fair  Recent and Remote Memory:  fair  Language: able to name/identify objects without impairment  Fund of Knowledge: intact with awareness of current and past events    ED Course        Labs Ordered and Resulted from Time of ED Arrival to Time of ED Departure   VALPROIC ACID - Abnormal       Result Value    Valproic acid <2.8 (*)    COMPREHENSIVE METABOLIC PANEL - Abnormal    Sodium 137      Potassium 4.4      Chloride 101      Carbon Dioxide (CO2) 27      Anion Gap 9      Urea Nitrogen 17.4      Creatinine 0.93      Calcium 9.5      Glucose 103 (*)     Alkaline Phosphatase 64      AST 17      ALT 19      Protein Total 7.4      Albumin 4.6      Bilirubin Total 0.4      GFR Estimate >90     COVID-19 VIRUS " (CORONAVIRUS) BY PCR - Normal    SARS CoV2 PCR Negative     DRUG ABUSE SCREEN 77 URINE (FL, RH, SH) - Normal    Amphetamines Urine Screen Negative      Barbituates Urine Screen Negative      Benzodiazepine Urine Screen Negative      Cannabinoids Urine Screen Negative      Opiates Urine Screen Negative      PCP Urine Screen Negative      Cocaine Urine Screen Negative         Assessments & Plan (with Medical Decision Making)   Patient presenting with bipolar diagnosis and recent methamphetamine use with psychotic symptoms.  Patient has not adherence to Depakote and now restarted.  Olanzapine had been added to help with psychotic symptoms.  We will continue treatment plan for stabilization.  Extra 5 mg dose of olanzapine will be given this evening to help further with ongoing psychotic symptoms.  Likely discharge home tomorrow.  Patient should continue with psychotherapy and outpatient psychiatric care.  Recommend chemical dependency treatment for methamphetamine use.  Nursing notes reviewed noting no acute issues.     I have reviewed the assessment completed by the [facility].     Preliminary diagnosis:    ICD-10-CM    1. Paranoia (H)  F22       2. Severe manic bipolar 1 disorder with psychotic behavior (H)  F31.2       3. Methamphetamine use disorder, severe, in early remission (H)  F15.21            Treatment Plan:  -Continue on observation status for safety and stabilization  -Continue Depakote DR 1500 mg at bedtime.  Monitor labs as appropriate.  -Continue olanzapine 5 mg at bedtime along with as needed doses during the day  -Recommend sobriety from methamphetamine and mood altering substances  -Reassess in the morning with hopeful discharge tomorrow  -Continue therapy and outpatient psychiatric care once discharged.  Recommend chemical dependency treatment for methamphetamine abuse.    --  Krystal Dougherty DO   [facility] EMERGENCY DEPT  [facility] Unit  4/22/2023     This note was created with voice  "recognition software. Inadvertent grammatical errors, typographical errors, and \"sound-a-like\" substitutions may occur due to limitations of the software.  Read the note carefully and apply context when erroneous substitutions have occurred. Thank you.        Krystal Dougherty DO  04/22/23 1704    "

## 2023-04-22 NOTE — PROGRESS NOTES
"Triage & Transition Services [facility]     Progress Note    Patient: Mendy goes by \"Iypmarlen,\" uses he/him pronouns  Date of Service: April 22, 2023  Site of Service: [facility]  Patient was seen in-person.     Presenting problem:  Please see initial DEC/[facility] Crisis Assessment completed by ROGERIO Grace on 04/22/23 for complete assessment information. Notable concerns include paranoia and anxiety.     Individuals Present: Mendy & ROGERIO Hurst    Session start: 3:45 PM  Session end: 4:15 PM  Session duration in minutes: 30  CPT utilized: 83898 - Psychotherapy (with patient) - 30 (16-37*) min    Current Presentation:   Pt reports worsening paranoia over the past 1.5-2 weeks. Pt conceptualizes this paranoia as another stage in his withdrawal from methamphetamines, which he stopped using on 11/24/22. He is aware that his paranoia is an extension of his mental health symptoms. Pt reports feeling like his life is in danger, that people are out to get him, and that he may be killed. When asked who he thinks may be out to get him, pt reports paranoia about hospital staff hurting him. Pt denies auditory or visual hallucinations. At the same time as pt is experiencing increased paranoia, he also endorses shame and guilt about his addiction. Pt is remorseful about his charge of indecent exposure from November 2022 when he was using methamphetamines and is worried about the effect that his mental illness is having on his mother. Pt perseverates on the legal system viewing him as a criminal when he is actually a good person who loves people. Pt denies suicidal ideation, including thoughts of wanting to fall asleep and not wake-up, as well as homicidal ideation. Pt originally requested to discharge home but, after being seen by the attending provider, agreed to stay on observation status overnight for continued monitoring of symptoms.     Additional Collateral Information:  This writer spoke to pt's mother Gloria Feliz at " [phone]. Pt signed an DAPHNE for his mother. This writer provided Gloria with an update on pt's current mental health symptoms, including his paranoia and anxiety. Gloria agrees with pt continuing to stay on observation status overnight for continued monitoring of symptoms. Gloria reported that pt has been consistently taking his Depakote at home, which writer relayed to the attending provider.     Mental Status Exam     Affect: Appropriate   Appearance: Appropriate    Attention Span/Concentration: Attentive  Eye Contact: Engaged   Fund of Knowledge: Appropriate    Language /Speech Content: Fluent   Language /Speech Volume: Normal    Language /Speech Rate/Productions: Normal    Recent Memory: Intact   Remote Memory: Intact   Mood: Anxious    Orientation to Person: Yes    Orientation to Place: Yes   Orientation to Time of Day: Yes    Orientation to Date: Yes    Situation (Do they understand why they are here?): Yes    Psychomotor Behavior: Normal    Thought Content: Paranoia   Thought Form: Obsessive/Perseverative     [place] Suicide Severity Rating Scale Since Last Contact: 04/22/23  Suicidal Ideation (Since Last Contact)  1. Wish to be Dead (Since Last Contact): No  2. Non-Specific Active Suicidal Thoughts (Since Last Contact): No  Suicidal Behavior (Since Last Contact)  Actual Attempt (Since Last Contact): No  Has subject engaged in non-suicidal self-injurious behavior? (Since Last Contact): No  Interrupted Attempts (Since Last Contact): No  Aborted or Self-Interrupted Attempt (Since Last Contact): No  Preparatory Acts or Behavior (Since Last Contact): No  Suicide (Since Last Contact): No     C-SSRS Risk (Since Last Contact)  Calculated C-SSRS Risk Score (Since Last Contact): No Risk Indicated    Validity of evaluation is not impacted by presenting factors during interview.   Comments regarding subjective versus objective responses to [place] tool: none  Environmental or Psychosocial Events: legal issues such  as DWI, DUI, lawsuit, CPS involvement, etc., loss of relationship due to divorce/separation, unemployment/underemployment and other life stressors  Chronic Risk Factors: history of psychiatric hospitalization, chronic and ongoing sleep difficulties and serious, persistent mental illness   Warning Signs: withdrawing from friends, family, and society and anxiety, agitation, unable to sleep, sleeping all the time  Protective Factors: strong bond to family unit, community support, or employment, responsibilities and duties to others, including pets and children, lives in a responsibly safe and stable environment, good treatment engagement, sense of importance of health and wellness, able to access care without barriers, supportive ongoing medical and mental health care relationships, help seeking, good impulse control, good problem-solving, coping, and conflict resolution skills, sense of belonging and optimistic outlook - identification of future goals  Interpretation of Risk Scoring, Risk Mitigation Interventions and Safety Plan:  Pt presents at no risk of suicide. He denies suicidal ideation, including thoughts of wanting to fall asleep and not wake-up again.     Diagnosis:   309.28 (F43.23) Adjustment Disorders  With mixed anxiety and depressed mood   296.50 Bipolar I Disorder Current or Most Recent Episode Depressed, unspecified - by history   Substance-Related & Addictive Disorders Stimulant Use Disorder:  In early remission  - by history       -Rule out a persistent psychotic disorder related to methamphetamine use    Therapeutic Intervention(s):   Provided active listening, unconditional positive regard, and validation. Engaged in safety planning.  Engaged in guided discovery, explored patient's perspectives and helped expand them through socratic dialogue.    Treatment Objective(s) Addressed:   The focus of this session was on identifying an appropriate aftercare plan.     Progress Towards Goals:   Patient  reports improving symptoms. Patient is making progress towards treatment goals as evidenced by increased insight into his paranoia since arriving at the hospital.     Case Management:   No case management completed today.    Clinical Summary and Substantiation of Recommendations   Observation: Pt is not exhibiting signs of an acute mental health crisis that would warrant inpatient hospitalization. He denies auditory or visual hallucinations, suicidal ideation, or homicidal ideation. Pt does endorse symptoms of paranoia but is aware that this paranoia is an extension of his mental illness. He is agreeable to stay on observation status overnight for continued treatment of his paranoia before discharging home. His discharge plan should include continued participation in his intensive outpatient programming for substance use through [facility], therapy, and psychiatry. He will need a therapy appointment and a referral to medication management through the [facility]. He already has a medication management appointment scheduled through [facility] for June 6th.      ROGERIO Hurst

## 2023-04-22 NOTE — ED NOTES
Pt is anxious and paranoid. Pt has some disorganized thoughts and is delayed in his speech. Pt states he has been having many thoughts that are not always true. Pt states he feeling like he in a another reality and is thinking people are seeing him differently. Pt was afraid that someone is out to get him and feeling unsafe. Pt was also feeling a lot of shame in regard to his meth use and states his culture and family do not look at him the same.Pt was given some PRN olanzapine to help manage these symptom . Pt was hesitant but cooperative with the medication. Pt is tense and has some though blocking when talking with staff. Pt has a blunt and flat affect. Pt denies any hallucinations and denies SI at this time. Plan to stay on OBS to observed if symptoms and reassess in the morning.

## 2023-04-22 NOTE — ED PROVIDER NOTES
[facility] Unit - Initial Psychiatric Observation Note  [facility] Emergency Department  Observation Initiation Date: Apr 21, 2023    Mendy Feliz MRN: [id number]   Age: 48 year old YOB: 1975     History     Chief Complaint   Patient presents with     Psychiatric Evaluation     HPI  Mendy Feliz is a 48 year old male with a past history notable for a bipolar disorder further complicated by a substance use disorder with prominent methamphetamine use.  He presented to the emergency department reporting concern for paranoia.  He was determined to be medically stable and transferred to the [facility] unit for psychiatric assessment.  On examination, he reports maintaining sobriety from methamphetamine since November of last year.  Despite maintaining sobriety, he notes ongoing symptoms of paranoia and ideas of reference.  Over the past 1 to 2 weeks, the intensity of the symptoms has escalated for reasons that are not clear to him.  He is concerned that someone may try to harm him or his family.  He is not certain if he can be safe on the unit because of these concerns.  He often worries that the telephone may be tapped and that others are conspiring against him.  His sleep has been poor over the past 1 week.  He denied other contributing psychosocial stressors.  He reports maintaining fair adherence to Depakote, missing 1 dose at the most over the past 1 week.  He denied suicidal and homicidal thoughts.  He denied auditory and visual hallucinations.    Past Medical History  Past Medical History:   Diagnosis Date     Bipolar affective disorder (H)      No past surgical history on file.  divalproex sodium delayed-release (DEPAKOTE) 500 MG DR tablet  acetaminophen (TYLENOL) 325 MG tablet  alum & mag hydroxide-simethicone (MAALOX) 200-200-20 MG/5ML SUSP suspension      Allergies   Allergen Reactions     Shrimp Hives     Mild allergy     Penicillins Rash     Sulfa Drugs Rash     Family History  Family History  "  Problem Relation Age of Onset     Family History Negative Mother      Family History Negative Father      Family History Negative Maternal Grandmother      Substance Abuse Other      Social History   Social History     Tobacco Use     Smoking status: Every Day     Packs/day: 0.50     Types: Cigarettes     Smokeless tobacco: Former   Substance Use Topics     Alcohol use: Yes     Comment: Alcoholic Drinks/day: binge drinking      Drug use: Yes     Types: Amphetamines, Cocaine, Marijuana, Methamphetamines, PCP, \"Crack\" cocaine     Comment: cocaine and marijuanna          Review of Systems  A medically appropriate review of systems was performed with pertinent positives and negatives noted in the HPI, and all other systems negative.    Physical Examination   BP: (!) 143/86  Pulse: 50  Temp: 98  F (36.7  C)  Resp: 16  Height: 172.7 cm (5' 8\")  Weight: 85.3 kg (188 lb)  SpO2: 99 %    Physical Exam  General: Appears stated age.   Neuro: Alert and fully oriented. Extremities appear to demonstrate normal strength on visual inspection.   Integumentary/Skin: no rash visualized, normal color    Psychiatric Examination   Appearance: awake, alert  Attitude:  cooperative and guarded  Eye Contact:  fair  Mood:  anxious  Affect:  intensity is heightened and guarded  Speech:  pressured speech  Psychomotor Behavior:  no evidence of tardive dyskinesia, dystonia, or tics  Thought Process:  tangental  Associations:  no loose associations  Thought Content:  no evidence of suicidal ideation or homicidal ideation and Paranoid delusions are evident  Insight:  fair  Judgement:  fair  Oriented to:  time, person, and place  Attention Span and Concentration:  fair  Recent and Remote Memory:  fair  Language: able to name/identify objects without impairment  Fund of Knowledge: intact with awareness of current and past events    ED Course        Labs Ordered and Resulted from Time of ED Arrival to Time of ED Departure   COVID-19 VIRUS " (CORONAVIRUS) BY PCR - Normal       Result Value    SARS CoV2 PCR Negative     VALPROIC ACID   COMPREHENSIVE METABOLIC PANEL       Assessments & Plan (with Medical Decision Making)   Patient presenting with acute worsening of paranoia in the context of a bipolar disorder and a history of intravenous methamphetamine use.  He reports maintaining 6 months of sobriety from methamphetamines.  He reports fair adherence to Depakote.  His presentation today is highlighted by manic-like symptoms and paranoid delusions.  He maintains fair insight into the symptoms however is clearly bothered by them and is seeking additional treatment interventions today to address these symptoms.  Nursing notes reviewed noting no acute issues.     I have reviewed the assessment completed by the [facility].     During the observation period, the patient did not require medications for agitation, and did not require restraints/seclusion for patient and/or provider safety.     The patient was found to have a psychiatric condition that would benefit from an observation stay in the emergency department for further psychiatric stabilization and/or coordination of a safe disposition. The observation plan includes serial assessments of psychiatric condition, potential administration of medications if indicated, further disposition pending the patient's psychiatric course during the monitoring period.     Preliminary diagnosis:    ICD-10-CM    1. Paranoia (H)  F22       2. Severe manic bipolar 1 disorder with psychotic behavior (H)  F31.2       3. Methamphetamine use disorder, severe, in early remission (H)  F15.21              -Rule out a persistent psychotic disorder related to methamphetamine use        Treatment Plan:  -Continue Depakote 1500 mg nightly for mood stabilization.  Check a serum level this evening prior to his evening dose.  -Begin Zyprexa 5 mg nightly for additional mood stabilization and reduction of psychosis  -Urine drug screen has  been ordered  -Enter to observation status and reassess tomorrow.    --  Jed Segura MD   [facility] EMERGENCY DEPT  [facility] Unit  4/21/2023        Jed Segura MD  04/21/23 1933

## 2023-04-22 NOTE — ED NOTES
Patient woke up and stated that he was doing a lot better.  He still has some paranoia.  He describes as fearful about people and things going on around him.  He feels like people are focused on him and thinking things about him.

## 2023-04-23 PROCEDURE — 99239 HOSP IP/OBS DSCHRG MGMT >30: CPT | Performed by: NURSE PRACTITIONER

## 2023-04-23 PROCEDURE — 250N000013 HC RX MED GY IP 250 OP 250 PS 637: Performed by: NURSE PRACTITIONER

## 2023-04-23 PROCEDURE — 250N000013 HC RX MED GY IP 250 OP 250 PS 637: Performed by: PSYCHIATRY & NEUROLOGY

## 2023-04-23 PROCEDURE — G0378 HOSPITAL OBSERVATION PER HR: HCPCS

## 2023-04-23 RX ORDER — OLANZAPINE 10 MG/1
10 TABLET ORAL AT BEDTIME
Qty: 30 TABLET | Refills: 0 | Status: SHIPPED | OUTPATIENT
Start: 2023-04-23

## 2023-04-23 RX ORDER — OLANZAPINE 10 MG/1
10 TABLET ORAL AT BEDTIME
Status: DISCONTINUED | OUTPATIENT
Start: 2023-04-23 | End: 2023-04-24 | Stop reason: HOSPADM

## 2023-04-23 RX ORDER — DIVALPROEX SODIUM 500 MG/1
TABLET, DELAYED RELEASE ORAL
Qty: 90 TABLET | Refills: 0 | Status: SHIPPED | OUTPATIENT
Start: 2023-04-23

## 2023-04-23 RX ORDER — OLANZAPINE 10 MG/1
10 TABLET ORAL ONCE
Status: DISCONTINUED | OUTPATIENT
Start: 2023-04-23 | End: 2023-04-23

## 2023-04-23 RX ADMIN — DIVALPROEX SODIUM 1500 MG: 500 TABLET, DELAYED RELEASE ORAL at 21:24

## 2023-04-23 RX ADMIN — OLANZAPINE 10 MG: 10 TABLET, FILM COATED ORAL at 21:23

## 2023-04-23 RX ADMIN — OLANZAPINE 5 MG: 5 TABLET, ORALLY DISINTEGRATING ORAL at 13:41

## 2023-04-23 ASSESSMENT — ACTIVITIES OF DAILY LIVING (ADL)
ADLS_ACUITY_SCORE: 35

## 2023-04-23 ASSESSMENT — COLUMBIA-SUICIDE SEVERITY RATING SCALE - C-SSRS
1. SINCE LAST CONTACT, HAVE YOU WISHED YOU WERE DEAD OR WISHED YOU COULD GO TO SLEEP AND NOT WAKE UP?: NO
SUICIDE, SINCE LAST CONTACT: NO
TOTAL  NUMBER OF ABORTED OR SELF INTERRUPTED ATTEMPTS SINCE LAST CONTACT: NO
TOTAL  NUMBER OF INTERRUPTED ATTEMPTS SINCE LAST CONTACT: NO
2. HAVE YOU ACTUALLY HAD ANY THOUGHTS OF KILLING YOURSELF?: NO
6. HAVE YOU EVER DONE ANYTHING, STARTED TO DO ANYTHING, OR PREPARED TO DO ANYTHING TO END YOUR LIFE?: NO
ATTEMPT SINCE LAST CONTACT: NO

## 2023-04-23 NOTE — PROGRESS NOTES
"Patient slept well. He is cooperative with staff. He continues to c/o feeling scared and \"Like the focus is all on me\" He states he feels safe here \"I know that this is a safe place.\" But worries about going home today. He has an intense stear and appears to be thought blocking. He c/o having thoughts \"that are just going around and around in my head. They quiet down if I am talking to some one else but the minuet I stop they come back.\"  He declines offer of Zyprexa.  "

## 2023-04-23 NOTE — ED PROVIDER NOTES
EmPATH Unit - Psychiatric Observation Discharge Summary  [facility] Emergency Department  Discharge Date: 4/23/2023    Mendy Feliz MRN: [id number]   Age: 48 year old YOB: 1975     Brief HPI & Initial ED Course     Chief Complaint   Patient presents with     Psychiatric Evaluation     HPI  Mendy Feliz is a 48 year old male with a past history notable for a bipolar disorder further complicated by a substance use disorder with prominent methamphetamine use.  He presented to the emergency department reporting concern for paranoia.  He was determined to be medically stable and transferred to the EmPATH unit for psychiatric assessment.  On examination, he reports maintaining sobriety from methamphetamine since November of last year.  Despite maintaining sobriety, he notes ongoing symptoms of paranoia and ideas of reference.  Over the past 1 to 2 weeks, the intensity of the symptoms has escalated for reasons that are not clear to him.  He is concerned that someone may try to harm him or his family.  He is not certain if he can be safe on the unit because of these concerns.  He often worries that the telephone may be tapped and that others are conspiring against him.  His sleep has been poor over the past 1 week.  He denied other contributing psychosocial stressors.  He reports maintaining fair adherence to Depakote, missing 1 dose at the most over the past 1 week.  He denied suicidal and homicidal thoughts.  He denied auditory and visual hallucinations.     4/23:  Patient seen for reassessment today. Patient continues to appear anxious, with difficulty making decisions. He endorses ongoing feelings that he is being set-up for something, that there may be police after him. He demonstrates some insight into the fact that these are paranoid thoughts and may not be reality. He discusses that he feels that there are messages on the TV directed toward him. He fears that his mother may be in danger, again  "demonstrating insight into the fact that this is likely untrue. He continues to experience periods of thought blocking. He worries about missing upcoming court dates, about missing other appointments. Discuss my recommendation to increase olanzapine at night to help decrease fears and paranoia, to which he ultimately agrees. He feels he would be most comfortable returning home today and speaking with his mother, whom he trusts the most. He denies any thoughts, plans, or intent to harm himself or anyone else. No hallucinations. He is sleeping well at night. He agrees to continue his medications upon return home and follow-up with outpatient providers.       Physical Examination   BP: 104/78  Pulse: 96  Temp: 97.9  F (36.6  C)  Resp: 18  Height: 172.7 cm (5' 8\")  Weight: 81.3 kg (179 lb 4.8 oz)  SpO2: 99 %    Physical Exam  General: Appears stated age.   Neuro: Alert and fully oriented. Extremities appear to demonstrate normal strength on visual inspection.   Integumentary/Skin: no rash visualized, normal color    Psychiatric Examination   Appearance: awake, alert, adequately groomed, dressed in hospital scrubs and appeared as age stated  Attitude:  cooperative  Eye Contact:  intense  Mood:  anxious  Affect:  mood congruent and intensity is normal  Speech:  clear, coherent and normal prosody  Psychomotor Behavior:  no evidence of tardive dyskinesia, dystonia, or tics  Thought Process:  evidence of thought blocking present and circumstantial  Associations:  no loose associations  Thought Content:  no evidence of suicidal ideation or homicidal ideation and demonstrating ongoing paranoid delusions   Insight:  fair  Judgement:  fair  Oriented to:  time, person, and place  Attention Span and Concentration:  fair  Recent and Remote Memory:  fair  Language: able to name/identify objects without impairment  Fund of Knowledge: intact with awareness of current and past events    Results        Labs Ordered and Resulted from " Time of ED Arrival to Time of ED Departure   VALPROIC ACID - Abnormal       Result Value    Valproic acid <2.8 (*)    COMPREHENSIVE METABOLIC PANEL - Abnormal    Sodium 137      Potassium 4.4      Chloride 101      Carbon Dioxide (CO2) 27      Anion Gap 9      Urea Nitrogen 17.4      Creatinine 0.93      Calcium 9.5      Glucose 103 (*)     Alkaline Phosphatase 64      AST 17      ALT 19      Protein Total 7.4      Albumin 4.6      Bilirubin Total 0.4      GFR Estimate >90     COVID-19 VIRUS (CORONAVIRUS) BY PCR - Normal    SARS CoV2 PCR Negative     DRUG ABUSE SCREEN 77 URINE (FL, RH, SH) - Normal    Amphetamines Urine Screen Negative      Barbituates Urine Screen Negative      Benzodiazepine Urine Screen Negative      Cannabinoids Urine Screen Negative      Opiates Urine Screen Negative      PCP Urine Screen Negative      Cocaine Urine Screen Negative         Observation Course   The patient was found to have a psychiatric condition that would benefit from an observation stay in the emergency department for further psychiatric stabilization and/or coordination of a safe disposition. The plan upon observation admission included serial assessments of psychiatric condition, potential administration of medications if indicated, further disposition pending the patient's psychiatric course during the monitoring period.     Serial assessments of the patient's psychiatric condition were performed. Nursing notes were reviewed. During the observation period, the patient did not require medications for agitation, and did not require restraints/seclusion for patient and/or provider safety.     After a period of working with the treatment team on the EmPATH unit, the patient's mental state improved to allow a safe transition to outpatient care. After counseling on the diagnosis, work-up, and treatment plan, the patient was discharged. Close follow-up with a psychiatrist and/or therapist was recommended and community  psychiatric resources were provided. Patient is to return to the ED if any urgent or potentially life-threatening concerns.     Discharge Diagnoses:   Final diagnoses:   Paranoia (H)   Severe manic bipolar 1 disorder with psychotic behavior (H)   Methamphetamine use disorder, severe, in early remission (H) - Rule-out a persistent psychotic disorder related to methamphetamine use       Treatment Plan:  - Continue Depakote DR 1500 mg at bedtime for mood stabilization. Follow-up with outpatient psychiatry for monitoring of drug levels and ongoing safe and therapeutic dosing. Level was almost undetectable here, indicating that he was likely not taking this at home.   - Increase olanzapine to 10 mg at bedtime for ongoing symptoms of psychosis  - Patient has an established outpatient psychiatrist and therapist with appointments pending. Will be provided with appointments in the [facility] as a bridge. He will continue outpatient [facility] treatment.   - Patient denies suicidal or homicidal ideation. He will be discharged home today to the care of family. He is aware that he can return if he is not feeling safe at home.       At the time of discharge, the patient's acute suicide risk was determined to be low due to the following factors: Reduction in the intensity of mood/anxiety symptoms that preceded the admission, denial of suicidal thoughts, denies feeling helpless or helpless, not currently under the influence of alcohol or illicit substances, denies experiencing command hallucinations, no immediate access to firearms. The patient's acute risk could be higher if noncompliant with their treatment plan, medications, follow-up appointments or using illicit substances or alcohol. Protective factors include: social supports, stable housing    I spent more than 30 minutes on discharge day activities.    --  Parmjit Eagle CNP  [facility] EMERGENCY DEPT  EmPATH Unit  4/23/2023      Parmjit Eagle,  CNP  04/23/23 [id number]

## 2023-04-23 NOTE — DISCHARGE INSTRUCTIONS
Aftercare Plan    Follow up with established providers and supports as scheduled at [facility]. Continue taking medications as prescribed. Keep working on abstaining from drugs and alcohol.     Tomorrow Monday 4/24 a  from [facility] will call you to schedule a virtual individual therapy appointment and a medication management (psychiatry) appt. You can see the psychiatry provider temporarily until your psychiatry appointment with Al[facility] on 6/6/23. If you have questions you can call [facility] at [phone].       If I am feeling unsafe or I am in a crisis, I will:   Contact my established care providers   Call the [place] Suicide Prevention Lifeline: [phone]   Go to the nearest emergency room   Call [phone]     Warning signs that I or other people might notice when a crisis is developing for me: changes to sleep, appetite or mood, increased anger, agitation or irritability, feeling depressed or hopeless, spending more time alone or talking less, increased crying, decreased productivity, seeing or hearing things that aren't there, thoughts of not wanting to live anymore or of actually killing myself, thoughts of hurting others    Things I am able to do on my own to cope or help me feel better: watching a favorite tv show or movie, listening to music I enjoy, going outside and breathing fresh air, going for a walk or exercising, taking a shower or bath, a cold or hot beverage, a healthy snack, drawing/coloring/painting, journaling, singing or dancing, deep breathing     I can try practicing square breathing when I begin to feel anxious - inhale through the nose for the count of 4 and the first line on the square. Exhale through the mouth for the count of 4 for the second line of the square. Repeat to complete the square. Repeat the square as many times as needed.    I can also use my five senses to practice mindfulness and grounding. What are five things I can see, four things I can  hear, three things I can feel, two things I can smell, and one thing I can taste.     Things that I am able to do with others to cope or help me feel better: sometimes just talking or spending time with someone else, sharing a meal or having coffee, watching a movie or playing a game, going for a walk or exercising    I can also use community resources including mental health hotlines, [facility] crisis teams, or apps.     Things I can use or do for distraction: movies/tv, music, reading, games, drawing/coloring/painting or other art, essential oils, exercise, cleaning/organizing, puzzles, crossword puzzles, word search, Sudoku       I can also download a meditation or relaxation juan jose, like Calm, Headspace, or Insight Timer (all three offer a free version)    Changes I can make to support my mental health and wellness: Attend scheduled mental health therapy and psychiatric appointments. Take my medications as prescribed. Maintain a daily schedule/routine. Abstain from all mood altering substances, including drugs, alcohol, or medications not currently prescribed to me. Implement a self-care routine.      People in my life that I can ask for help: friends or family, trusted teachers/staff/colleagues, trusted members of my community or place of Judaism, mental health crisis lines, or 911    Your [facility] has a mental health crisis team you can call 24/7: [facility], [phone]    Other things that are important when I m in crisis: to remember that the feelings I am having right now are temporary, and it won't feel like this forever, and that it is okay and important to ask for help    Crisis Lines  Crisis Text Line  Text 778587  You will be connected with a trained live crisis counselor to provide support.    Por espanol, texto  TING a 498530 o texto a 442-AYUDAME en WhatsApp    National Hope Line  1.800.SUICIDE [4460241]      Community Resources  Fast Tracker  Linking people to mental health and substance use  "disorder resources  fasttrackermn.org     [place] Mental Health Warm Line  Peer to peer support  Monday thru Saturday, 12 pm to 10 pm  329.588.9933 or 5.968.884.5289  Text \"Support\" to 20660    National [place] on Mental Illness (LASHAE)  798.718.9696 or 1.888.LASHAE.HELPS      Mental Health Apps  My3  https://[employer].org/    VirtualHopeBox  https://[employer]/apps/virtual-hope-box/      Additional Information  Today you were seen by a licensed mental health professional through Triage and Transition services, [facility] ([facility])  for a crisis assessment in the Emergency Department at [facility].  It is recommended that you follow up with your established providers (psychiatrist, mental health therapist, and/or primary care doctor - as relevant) as soon as possible. Coordinators from [facility] will be calling you in the next 24-48 hours to ensure that you have the resources you need.  You can also contact [facility] coordinators directly at [phone]. You may have been scheduled for or offered an appointment with a mental health provider. [facility] maintains an extensive network of licensed behavioral health providers to connect patients with the services they need.  We do not charge providers a fee to participate in our referral network.  We match patients with providers based on a patient's specific needs, insurance coverage, and location.  Our first effort will be to refer you to a provider within your care system, and will utilize providers outside your care system as needed.      "

## 2023-04-23 NOTE — PROGRESS NOTES
[facility] Crisis Reassessment      Mendy Feliz was reassessed due to obs status at [facility]. Pt was first seen on 4/21/23 by ROGERIO Grace; see the initial assessment note for details.      Patient Presentation    Initial ED presentation details: presented for concerns of paranoia, anxiety, depression    Current patient presentation: Mendy shows continued improvement in his mental health sx. He is alert and oriented x4 today. He displayed some delayed thinking, some difficulty organizing and verbally expressing thoughts. However he displayed good insight into his mental health concerns, was able to fully track conversation with writer and thinking was logical. He denied auditory or visual hallucinations, suicidal ideation, or homicidal ideation. He reported feeling ready and safe to discharge today. He fully participated in aftercare and safety planning, endorsed wanting to follow up with outpatient mental health referrals (see AVS).     Changes observed since initial assessment: Pt reported a reduction in intensity of presenting mental health concerns       Risk of Harm    [place] Suicide Severity Rating Scale Full Clinical Version: 4/21/23  Suicidal Ideation  1. Wish to be Dead (Lifetime): No  2. Non-Specific Active Suicidal Thoughts (Lifetime): No     Suicidal Behavior  Actual Attempt (Lifetime): No  Has subject engaged in non-suicidal self-injurious behavior? (Lifetime): No  Interrupted Attempts (Lifetime): No  Aborted or Self-Interrupted Attempt (Lifetime): No  Preparatory Acts or Behavior (Lifetime): No  C-SSRS Risk (Lifetime/Recent)  Calculated C-SSRS Risk Score (Lifetime/Recent): No Risk Indicated    [place] Suicide Severity Rating Scale Since Last Contact: 4/23/23  Suicidal Ideation (Since Last Contact)  1. Wish to be Dead (Since Last Contact): No  2. Non-Specific Active Suicidal Thoughts (Since Last Contact): No  Suicidal Behavior (Since Last Contact)  Actual Attempt (Since Last Contact): No  Has  subject engaged in non-suicidal self-injurious behavior? (Since Last Contact): No  Interrupted Attempts (Since Last Contact): No  Aborted or Self-Interrupted Attempt (Since Last Contact): No  Preparatory Acts or Behavior (Since Last Contact): No  Suicide (Since Last Contact): No     C-SSRS Risk (Since Last Contact)  Calculated C-SSRS Risk Score (Since Last Contact): No Risk Indicated    Validity of evaluation is not impacted by presenting factors during interview.   Comments regarding subjective versus objective responses to [place] tool: none  Environmental or Psychosocial Events: legal issues such as DWI, DUI, lawsuit, CPS involvement, etc., loss of relationship due to divorce/separation, unemployment/underemployment and other life stressors  Chronic Risk Factors: history of psychiatric hospitalization, chronic and ongoing sleep difficulties and serious, persistent mental illness   Warning Signs: withdrawing from friends, family, and society and anxiety, agitation, unable to sleep, sleeping all the time  Protective Factors: strong bond to family unit, community support, or employment, responsibilities and duties to others, including pets and children, lives in a responsibly safe and stable environment, good treatment engagement, sense of importance of health and wellness, able to access care without barriers, supportive ongoing medical and mental health care relationships, help seeking, good impulse control, good problem-solving, coping, and conflict resolution skills, sense of belonging and optimistic outlook - identification of future goals  Interpretation of Risk Scoring, Risk Mitigation Interventions and Safety Plan:  No risk identified at this time. Iype denied suicidal ideation. No hx of suicidal ideation found in chart or indicated by Iype. He is future oriented and help seeking.     Does the patient have thoughts of harming others? No    Mental Status Exam   Affect: Appropriate   Appearance: Appropriate     Attention Span/Concentration: Attentive?    Eye Contact: Engaged   Fund of Knowledge: Appropriate    Language /Speech Content: Fluent   Language /Speech Volume: Normal    Language /Speech Rate/Productions: Normal    Recent Memory: Intact   Remote Memory: Intact   Mood: Normal    Orientation to Person: Yes    Orientation to Place: Yes   Orientation to Time of Day: Yes    Orientation to Date: Yes    Situation (Do they understand why they are here?): Yes    Psychomotor Behavior: Normal    Thought Content: Clear   Thought Form: Intact       Additional Collateral Information   n/a      Therapeutic Intervention  The following therapeutic methodologies were employed when working with the patient: Establishing rapport, Active listening, Identify additional supports and alternative coping skills, Establish a discharge plan, Motivational Interviewing and Safety planning. Patient response to intervention: engaged.    Diagnosis:   Per initial assessment by Barney Burton LGSW:  309.28 (F43.23) Adjustment Disorders  With mixed anxiety and depressed mood   296.50 Bipolar I Disorder Current or Most Recent Episode Depressed, unspecified - by history   Substance-Related & Addictive Disorders Stimulant Use Disorder:  In early remission  - by history       -Rule out a persistent psychotic disorder related to methamphetamine use    Clinical Substantiation of Recommendations  After the period in observation care, the patient's circumstances and mental state were safe for outpatient management. No risk identified at this time. Iype denied suicidal ideation. No hx of suicidal ideation found in chart or indicated by Iype. He is future oriented and help seeking. He is alert and oriented x4, tracking conversation with writer. He engaged in safety and aftercare planning. After therapeutic treatment, intervention and aftercare planning by [facility] care team and consultation with attending provider, the patient was discharged. Close follow-up  with a psychiatrist and/or therapist was recommended and community psychiatric resources were provided. Patient is to return to the ED if any urgent or potentially life-threatening concerns arise.       At the time of discharge, the patient's acute suicide risk was determined to be low due to the following factors: reduction in the intensity of mood/anxiety symptoms that preceded the admission, denial of suicidal thoughts, denies feeling helpless or hopeless, not currently under the influence of alcohol or illicit substances, denies experiencing command hallucinations and no immediate access to firearms. Protective factors include: social support, voluntarily seeking mental health support, displays resiliency , established relationship community mental health provider(s), future focused thinking, displays insight, expresses desire to engage in treatment, sense of obligation to people/pets and safe/stable housing    Plan:    Disposition  Recommended disposition: Individual Therapy, Medication Management and Other: continue [facility] tx with [facility]      Reviewed case and recommendations with attending provider. Attending Name: Ronnie Eagle CNP, APRN      Attending concurs with disposition: Yes      Patient and/or verified legal guardian concurs with disposition: Yes      Final disposition: Other: remain on obs until meeting with attending provider and final dispo is determined.         Assessment Details  Total duration spent on the patient case in minutes: .50 hrs     CPT code(s) utilized: 73153 - Psychotherapy (with patient) - 30 (16-37*) min       LUCY Harris, [facility]  Callback: [phone]    Aftercare Plan    Follow up with established providers and supports as scheduled at [facility]. Continue taking medications as prescribed. Keep working on abstaining from drugs and alcohol.     Tomorrow Monday 4/24 a  from [facility] will call you to schedule a virtual individual  therapy appointment and a medication management (psychiatry) appt. You can see the psychiatry provider temporarily until your psychiatry appointment with Alma Rosa on 6/6/23. If you have questions you can call [facility] at [phone].       If I am feeling unsafe or I am in a crisis, I will:   Contact my established care providers   Call the [place] Suicide Prevention Lifeline: [phone]   Go to the nearest emergency room   Call [phone]     Warning signs that I or other people might notice when a crisis is developing for me: changes to sleep, appetite or mood, increased anger, agitation or irritability, feeling depressed or hopeless, spending more time alone or talking less, increased crying, decreased productivity, seeing or hearing things that aren't there, thoughts of not wanting to live anymore or of actually killing myself, thoughts of hurting others    Things I am able to do on my own to cope or help me feel better: watching a favorite tv show or movie, listening to music I enjoy, going outside and breathing fresh air, going for a walk or exercising, taking a shower or bath, a cold or hot beverage, a healthy snack, drawing/coloring/painting, journaling, singing or dancing, deep breathing     I can try practicing square breathing when I begin to feel anxious - inhale through the nose for the count of 4 and the first line on the square. Exhale through the mouth for the count of 4 for the second line of the square. Repeat to complete the square. Repeat the square as many times as needed.    I can also use my five senses to practice mindfulness and grounding. What are five things I can see, four things I can hear, three things I can feel, two things I can smell, and one thing I can taste.     Things that I am able to do with others to cope or help me feel better: sometimes just talking or spending time with someone else, sharing a meal or having coffee, watching a movie or playing a game, going for a walk or  "exercising    I can also use community resources including mental health hotlines, [facility] crisis teams, or apps.     Things I can use or do for distraction: movies/tv, music, reading, games, drawing/coloring/painting or other art, essential oils, exercise, cleaning/organizing, puzzles, crossword puzzles, word search, Sudoku       I can also download a meditation or relaxation juan jose, like Calm, Headspace, or Insight Timer (all three offer a free version)    Changes I can make to support my mental health and wellness: Attend scheduled mental health therapy and psychiatric appointments. Take my medications as prescribed. Maintain a daily schedule/routine. Abstain from all mood altering substances, including drugs, alcohol, or medications not currently prescribed to me. Implement a self-care routine.      People in my life that I can ask for help: friends or family, trusted teachers/staff/colleagues, trusted members of my community or place of Religious, mental health crisis lines, or 911    Your [facility] has a mental health crisis team you can call 24/7: [facility], [phone]    Other things that are important when I m in crisis: to remember that the feelings I am having right now are temporary, and it won't feel like this forever, and that it is okay and important to ask for help    Crisis Lines  Crisis Text Line  Text 348128  You will be connected with a trained live crisis counselor to provide support.    Por espanol, texto  TING a 968116 o texto a 442-AYUDAME en WhatsApp    [place] Hope Line  1.800.SUICIDE [3216343]      Community Resources  Fast Tracker  Linking people to mental health and substance use disorder resources  fasttrackermn.org     [place] Mental Health Warm Line  Peer to peer support  Monday thru Saturday, 12 pm to 10 pm  390.919.9432 or 8.961.016.9459  Text \"Support\" to 04323    [employer] Mental Illness (LASHAE)  434.338.4173 or 1.888.LASHAE.HELPS      Mental Health Apps  My3  " https://mySurface Medicalpp.org/    VirtualHopeBox  https://O[employer]/apps/virtual-hope-box/      Additional Information  Today you were seen by a licensed mental health professional through Triage and Transition services, [facility] ([facility])  for a crisis assessment in the Emergency Department at [facility].  It is recommended that you follow up with your established providers (psychiatrist, mental health therapist, and/or primary care doctor - as relevant) as soon as possible. Coordinators from [facility] will be calling you in the next 24-48 hours to ensure that you have the resources you need.  You can also contact [facility] coordinators directly at [phone]. You may have been scheduled for or offered an appointment with a mental health provider. [facility] maintains an extensive network of licensed behavioral health providers to connect patients with the services they need.  We do not charge providers a fee to participate in our referral network.  We match patients with providers based on a patient's specific needs, insurance coverage, and location.  Our first effort will be to refer you to a provider within your care system, and will utilize providers outside your care system as needed.

## 2023-04-24 VITALS
RESPIRATION RATE: 16 BRPM | TEMPERATURE: 98.8 F | WEIGHT: 179.3 LBS | HEIGHT: 68 IN | SYSTOLIC BLOOD PRESSURE: 124 MMHG | HEART RATE: 92 BPM | DIASTOLIC BLOOD PRESSURE: 78 MMHG | BODY MASS INDEX: 27.17 KG/M2 | OXYGEN SATURATION: 100 %

## 2023-04-24 PROCEDURE — G0378 HOSPITAL OBSERVATION PER HR: HCPCS

## 2023-04-24 ASSESSMENT — ACTIVITIES OF DAILY LIVING (ADL)
ADLS_ACUITY_SCORE: 35

## 2023-04-24 NOTE — PROGRESS NOTES
Patient awake, alert and oriented x4 at start of shift. Observed sitting at table in milieu. He is pleasant on approach, eye contact appropriate. Full range affect, mood anxious. Patient reports feeling safe to discharge home this morning. He continues to feel mildly paranoid about people coming to hurt him but states these thoughts are not as intense as yesterday. Patient denies SI/HI/AH/VH. Pt prescriptions sent to his home pharmacy and he plans to have his mother pick these up today. Awaiting cab to take pt home.

## 2023-04-24 NOTE — ED NOTES
Pt has been keeping to himself through most of the shift and has been resting in his chair. Pt has been concerned about his discharge tomorrow and is wiling to stay tonight to see how well he does tomorrow and to gain more stability.Pt is wanting to discharge in the morning to ensure that his mother is home because she is the only one with the keys to the house. Pt feeling a bit better and his thoughts are more congruent. Pt is still having some mild thought blocking but it is not as intense as it was yesterday . Pt is more goal directed and able to focus. Pt is pleasant and cooperative throughout the shift. Pt has his meds sent to his pharmacy. Nursing to continue to monitor and plan to discharge tomorrow.

## 2023-04-24 NOTE — PROGRESS NOTES
Discharge instructions reviewed with patient including follow-up care plan. Educated on medication regimen and advised not to stop prescribed medication without consulting their physician. Reviewed safety plan and outpatient resources. Denies SI. All belongings which were brought into the hospital have been returned to patient. Escorted off the unit at 0830 accompanied by JENNY Godwin. Discharged to home in stable condition via taxi cab.

## 2023-04-26 ENCOUNTER — PATIENT OUTREACH (OUTPATIENT)
Dept: CARE COORDINATION | Facility: CLINIC | Age: 48
End: 2023-04-26
Payer: COMMERCIAL

## 2023-04-26 NOTE — PROGRESS NOTES
Connected Care Resource Center Contact  [facility]/Voicemail     Clinical Data: Transitional Care Management Outreach     Outreach attempted x 2.  Left message on patient's voicemail, providing [facility]'s 24/7 scheduling and nurse triage phone number [phone] ([phone]) for questions/concerns and/or to schedule an appt with an [facility] provider, if they do not have a PCP.      Plan:  [facility] will do no further outreaches at this time.       Franck Wheatley  Connected Care Resource Center, [facility]    *Connected Care Resource Team does NOT follow patient ongoing. Referrals are identified based on internal discharge reports and the outreach is to ensure patient has an understanding of their discharge instructions.

## 2024-06-15 ENCOUNTER — HEALTH MAINTENANCE LETTER (OUTPATIENT)
Age: 49
End: 2024-06-15

## 2025-06-21 ENCOUNTER — HEALTH MAINTENANCE LETTER (OUTPATIENT)
Age: 50
End: 2025-06-21